# Patient Record
Sex: FEMALE | Race: WHITE | Employment: OTHER | URBAN - METROPOLITAN AREA
[De-identification: names, ages, dates, MRNs, and addresses within clinical notes are randomized per-mention and may not be internally consistent; named-entity substitution may affect disease eponyms.]

---

## 2017-09-21 ENCOUNTER — OFFICE VISIT (OUTPATIENT)
Dept: INTERNAL MEDICINE CLINIC | Age: 82
End: 2017-09-21

## 2017-09-21 ENCOUNTER — HOSPITAL ENCOUNTER (OUTPATIENT)
Dept: LAB | Age: 82
Discharge: HOME | End: 2017-09-21
Payer: MEDICARE

## 2017-09-21 VITALS
OXYGEN SATURATION: 94 % | WEIGHT: 142.6 LBS | HEART RATE: 71 BPM | DIASTOLIC BLOOD PRESSURE: 71 MMHG | HEIGHT: 64 IN | SYSTOLIC BLOOD PRESSURE: 134 MMHG | BODY MASS INDEX: 24.34 KG/M2 | TEMPERATURE: 97.7 F | RESPIRATION RATE: 16 BRPM

## 2017-09-21 DIAGNOSIS — F32.9 REACTIVE DEPRESSION: ICD-10-CM

## 2017-09-21 DIAGNOSIS — F03.B18 MODERATE DEMENTIA WITH BEHAVIORAL DISTURBANCE: Primary | ICD-10-CM

## 2017-09-21 DIAGNOSIS — C44.91 BASAL CELL CARCINOMA: ICD-10-CM

## 2017-09-21 DIAGNOSIS — Z13.5 GLAUCOMA SCREENING: ICD-10-CM

## 2017-09-21 DIAGNOSIS — E55.9 VITAMIN D DEFICIENCY: ICD-10-CM

## 2017-09-21 DIAGNOSIS — E06.3 AUTOIMMUNE HYPOTHYROIDISM: ICD-10-CM

## 2017-09-21 DIAGNOSIS — E53.8 B12 DEFICIENCY: ICD-10-CM

## 2017-09-21 PROCEDURE — 84443 ASSAY THYROID STIM HORMONE: CPT

## 2017-09-21 PROCEDURE — 82306 VITAMIN D 25 HYDROXY: CPT

## 2017-09-21 PROCEDURE — 82607 VITAMIN B-12: CPT

## 2017-09-21 PROCEDURE — 36415 COLL VENOUS BLD VENIPUNCTURE: CPT

## 2017-09-21 PROCEDURE — 81003 URINALYSIS AUTO W/O SCOPE: CPT

## 2017-09-21 PROCEDURE — 85025 COMPLETE CBC W/AUTO DIFF WBC: CPT

## 2017-09-21 PROCEDURE — 80053 COMPREHEN METABOLIC PANEL: CPT

## 2017-09-21 RX ORDER — LOSARTAN POTASSIUM 50 MG/1
TABLET ORAL DAILY
COMMUNITY
End: 2017-12-01 | Stop reason: SDUPTHER

## 2017-09-21 RX ORDER — POTASSIUM CHLORIDE 1.5 G/1.77G
20 POWDER, FOR SOLUTION ORAL DAILY
COMMUNITY
End: 2017-12-01 | Stop reason: SDUPTHER

## 2017-09-21 RX ORDER — LANOLIN ALCOHOL/MO/W.PET/CERES
500 CREAM (GRAM) TOPICAL DAILY
COMMUNITY
End: 2017-12-01 | Stop reason: SDUPTHER

## 2017-09-21 RX ORDER — LEVOTHYROXINE SODIUM 88 UG/1
TABLET ORAL
COMMUNITY
End: 2017-12-01 | Stop reason: SDUPTHER

## 2017-09-21 RX ORDER — DONEPEZIL HYDROCHLORIDE 5 MG/1
TABLET, FILM COATED ORAL DAILY
COMMUNITY
End: 2017-12-01 | Stop reason: SDUPTHER

## 2017-09-21 RX ORDER — LEVOTHYROXINE SODIUM 100 UG/1
TABLET ORAL
COMMUNITY
End: 2017-12-01 | Stop reason: SDUPTHER

## 2017-09-21 RX ORDER — LORAZEPAM 0.5 MG/1
TABLET ORAL
COMMUNITY
End: 2017-12-01 | Stop reason: SDUPTHER

## 2017-09-21 RX ORDER — CHOLECALCIFEROL (VITAMIN D3) 125 MCG
CAPSULE ORAL 2 TIMES DAILY
COMMUNITY
End: 2017-12-01 | Stop reason: SDUPTHER

## 2017-09-21 RX ORDER — MEMANTINE HYDROCHLORIDE 21 MG/1
CAPSULE, EXTENDED RELEASE ORAL DAILY
COMMUNITY
End: 2017-12-01 | Stop reason: SDUPTHER

## 2017-09-21 RX ORDER — CITALOPRAM 10 MG/1
TABLET ORAL EVERY EVENING
COMMUNITY
End: 2017-09-21 | Stop reason: SDUPTHER

## 2017-09-21 RX ORDER — CITALOPRAM 10 MG/1
10 TABLET ORAL EVERY EVENING
Qty: 30 TAB | Refills: 3 | Status: SHIPPED | OUTPATIENT
Start: 2017-09-21 | End: 2017-10-02 | Stop reason: DRUGHIGH

## 2017-09-21 NOTE — MR AVS SNAPSHOT
Visit Information Date & Time Provider Department Dept. Phone Encounter #  
 9/21/2017  3:00 PM Thefred Acuña, 12[address] [facility] [phone] [id number] Upcoming Health Maintenance Date Due DTaP/Tdap/Td series (1 - Tdap) 1/15/1951 ZOSTER VACCINE AGE 60> 11/15/1989 GLAUCOMA SCREENING Q2Y 1/15/1995 OSTEOPOROSIS SCREENING (DEXA) 1/15/1995 Pneumococcal 65+ Low/Medium Risk (1 of 2 - PCV13) 1/15/1995 MEDICARE YEARLY EXAM 1/15/1995 INFLUENZA AGE 9 TO ADULT 8/1/2017 Allergies as of 9/21/2017  Review Complete On: 9/21/2017 By: Armin Frank No Known Allergies Current Immunizations  Never Reviewed No immunizations on file. Not reviewed this visit You Were Diagnosed With   
  
 Codes Comments Moderate dementia with behavioral disturbance    -  Primary ICD-10-CM: F03.91 
ICD-9-CM: 294.21 Basal cell carcinoma     ICD-10-CM: C44.91 
ICD-9-CM: 173.91 Glaucoma screening     ICD-10-CM: Z13.5 ICD-9-CM: V80.1 Reactive depression     ICD-10-CM: F32.9 ICD-9-CM: 300.4 Autoimmune hypothyroidism     ICD-10-CM: E03.9 ICD-9-CM: 244.9 B12 deficiency     ICD-10-CM: E53.8 ICD-9-CM: 266.2 Vitamin D deficiency     ICD-10-CM: E55.9 ICD-9-CM: 268.9 Vitals BP Pulse Temp Resp Height(growth percentile) Weight(growth percentile) 134/71 (BP 1 Location: Right arm, BP Patient Position: Sitting) 71 97.7 °F (36.5 °C) (Oral) 16 5' 3.5\" (1.613 m) 142 lb 9.6 oz (64.7 kg) SpO2 BMI Smoking Status 94% 24.86 kg/m2 Never Smoker BMI and BSA Data Body Mass Index Body Surface Area  
 24.86 kg/m 2 1.7 m 2 Preferred Pharmacy Pharmacy Name Phone [address]  [place] [address] [phone] Your Updated Medication List  
  
   
This list is accurate as of: 9/21/17  4:05 PM.  Always use your most recent med list.  
  
  
  
  
 citalopram 10 mg tablet Commonly known as:  Sable Deaner Take 1 Tab by mouth every evening. At bedtime. Changing to 20 mg every day  
  
 donepezil 5 mg tablet Commonly known as:  ARICEPT Take  by mouth daily. * levothyroxine 88 mcg tablet Commonly known as:  SYNTHROID Take  by mouth. Take 1 tab by mouth daily except for Sunday and wednesday * levothyroxine 100 mcg tablet Commonly known as:  SYNTHROID Take  by mouth. Take 1 tab every Sunday and Wednesday only. LORazepam 0.5 mg tablet Commonly known as:  ATIVAN Take  by mouth. Take 1/2 to 1 tab by mouth every 8 hrs as needed for anxiety/agitation. losartan 50 mg tablet Commonly known as:  COZAAR Take  by mouth daily. NAMENDA XR 21 mg capsule Generic drug:  memantine ER Take  by mouth daily. potassium chloride 20 mEq packet Commonly known as:  KLOR-CON Take 20 mEq by mouth daily. VITAMIN B-12 500 mcg tablet Generic drug:  cyanocobalamin Take 500 mcg by mouth daily. VITAMIN D3 2,000 unit Tab Generic drug:  cholecalciferol (vitamin D3) Take  by mouth two (2) times a day. * Notice: This list has 2 medication(s) that are the same as other medications prescribed for you. Read the directions carefully, and ask your doctor or other care provider to review them with you. Prescriptions Sent to Pharmacy Refills  
 citalopram (CELEXA) 10 mg tablet 3 Sig: Take 1 Tab by mouth every evening. At bedtime. Changing to 20 mg every day Class: Normal  
 Pharmacy: [address] [place] [address] 18, [address] #: [phone] Route: Oral  
  
We Performed the Following CBC WITH AUTOMATED DIFF [08717 CPT(R)] METABOLIC PANEL, COMPREHENSIVE [08767 CPT(R)] REFERRAL TO OPHTHALMOLOGY [REF57 Custom] Comments:  
 Please evaluate patient for routine eye care, glaucoma screening REFERRAL TO PSYCHIATRY [REF91 Custom] Comments:  
 Please evaluate patient for agitated dementia TSH 3RD GENERATION [50070 CPT(R)] URINALYSIS W/ RFLX MICROSCOPIC [05160 CPT(R)] VITAMIN B12 [id number] CPT(R)] VITAMIN D, 25 HYDROXY [id number] CPT(R)] Referral Information Referral ID Referred By Referred To  
  
 [id number] Tesha VALADEZ1 [facility] MD   
   [address] Phone: [phone] Fax: [phone] Visits Status Start Date End Date 1 New Request 9/21/17 9/21/18 If your referral has a status of pending review or denied, additional information will be sent to support the outcome of this decision. Referral ID Referred By Referred To  
 [id number] David VALADEZ MD  
   [address] [facility] [address] [place] [address], [address] [place] [address] Phone: [phone] Fax: [phone] Visits Status Start Date End Date 1 New Request 9/21/17 9/21/18 If your referral has a status of pending review or denied, additional information will be sent to support the outcome of this decision. Introducing [facility] & HEALTH SERVICES! William Campos introduces Log[employer] Apps patient portal. Now you can access parts of your medical record, email your doctor's office, and request medication refills online. 1. In your internet browser, go to [email or web address] 2. Click on the First Time User? Click Here link in the Sign In box. You will see the New Member Sign Up page. 3. Enter your Logical Apps Access Code exactly as it appears below. You will not need to use this code after youve completed the sign-up process. If you do not sign up before the expiration date, you must request a new code. · Log[employer] Apps Access Code: [email or web address] Expires: 12/20/2017  3:05 PM 
 
4. Enter the last four digits of your Social Security Number (xxxx) and Date of Birth (mm/dd/yyyy) as indicated and click Submit. You will be taken to the next sign-up page. 5. Create a Ezakus ID. This will be your Ezakus login ID and cannot be changed, so think of one that is secure and easy to remember. 6. Create a Ezakus password. You can change your password at any time. 7. Enter your Password Reset Question and Answer. This can be used at a later time if you forget your password. 8. Enter your e-mail address. You will receive e-mail notification when new information is available in [address] 9. Click Sign Up. You can now view and download portions of your medical record. 10. Click the Download Summary menu link to download a portable copy of your medical information. If you have questions, please visit the Frequently Asked Questions section of the Ezakus website. Remember, Ezakus is NOT to be used for urgent needs. For medical emergencies, dial 911. Now available from your iPhone and Android! Please provide this summary of care documentation to your next provider. Your primary care clinician is listed as RAMÓN VALADEZ. If you have any questions after today's visit, please call [phone].

## 2017-09-22 ENCOUNTER — TELEPHONE (OUTPATIENT)
Dept: INTERNAL MEDICINE CLINIC | Age: 82
End: 2017-09-22

## 2017-09-22 LAB
25(OH)D3+25(OH)D2 SERPL-MCNC: 48.2 NG/ML (ref 30–100)
ALBUMIN SERPL-MCNC: 4.2 G/DL (ref 3.5–4.7)
ALBUMIN/GLOB SERPL: 1.6 {RATIO} (ref 1.2–2.2)
ALP SERPL-CCNC: 70 IU/L (ref 39–117)
ALT SERPL-CCNC: 8 IU/L (ref 0–32)
APPEARANCE UR: ABNORMAL
AST SERPL-CCNC: 16 IU/L (ref 0–40)
BACTERIA #/AREA URNS HPF: ABNORMAL /[HPF]
BASOPHILS # BLD AUTO: 0 X10E3/UL (ref 0–0.2)
BASOPHILS NFR BLD AUTO: 0 %
BILIRUB SERPL-MCNC: 0.6 MG/DL (ref 0–1.2)
BILIRUB UR QL STRIP: NEGATIVE
BUN SERPL-MCNC: 15 MG/DL (ref 8–27)
BUN/CREAT SERPL: 18 (ref 12–28)
CALCIUM SERPL-MCNC: 9.6 MG/DL (ref 8.7–10.3)
CASTS URNS QL MICRO: ABNORMAL /LPF
CHLORIDE SERPL-SCNC: 102 MMOL/L (ref 96–106)
CO2 SERPL-SCNC: 25 MMOL/L (ref 18–29)
COLOR UR: YELLOW
CREAT SERPL-MCNC: 0.82 MG/DL (ref 0.57–1)
EOSINOPHIL # BLD AUTO: 0.3 X10E3/UL (ref 0–0.4)
EOSINOPHIL NFR BLD AUTO: 3 %
EPI CELLS #/AREA URNS HPF: ABNORMAL /HPF
ERYTHROCYTE [DISTWIDTH] IN BLOOD BY AUTOMATED COUNT: 13.3 % (ref 12.3–15.4)
GLOBULIN SER CALC-MCNC: 2.6 G/DL (ref 1.5–4.5)
GLUCOSE SERPL-MCNC: 81 MG/DL (ref 65–99)
GLUCOSE UR QL: NEGATIVE
HCT VFR BLD AUTO: 42.6 % (ref 34–46.6)
HGB BLD-MCNC: 14.3 G/DL (ref 11.1–15.9)
HGB UR QL STRIP: ABNORMAL
IMM GRANULOCYTES # BLD: 0 X10E3/UL (ref 0–0.1)
IMM GRANULOCYTES NFR BLD: 0 %
KETONES UR QL STRIP: NEGATIVE
LEUKOCYTE ESTERASE UR QL STRIP: NEGATIVE
LYMPHOCYTES # BLD AUTO: 3.2 X10E3/UL (ref 0.7–3.1)
LYMPHOCYTES NFR BLD AUTO: 43 %
MCH RBC QN AUTO: 31.6 PG (ref 26.6–33)
MCHC RBC AUTO-ENTMCNC: 33.6 G/DL (ref 31.5–35.7)
MCV RBC AUTO: 94 FL (ref 79–97)
MICRO URNS: ABNORMAL
MONOCYTES # BLD AUTO: 0.7 X10E3/UL (ref 0.1–0.9)
MONOCYTES NFR BLD AUTO: 9 %
MUCOUS THREADS URNS QL MICRO: PRESENT
NEUTROPHILS # BLD AUTO: 3.3 X10E3/UL (ref 1.4–7)
NEUTROPHILS NFR BLD AUTO: 45 %
NITRITE UR QL STRIP: NEGATIVE
PH UR STRIP: 6 [PH] (ref 5–7.5)
PLATELET # BLD AUTO: 280 X10E3/UL (ref 150–379)
POTASSIUM SERPL-SCNC: 4.3 MMOL/L (ref 3.5–5.2)
PROT SERPL-MCNC: 6.8 G/DL (ref 6–8.5)
PROT UR QL STRIP: NEGATIVE
RBC # BLD AUTO: 4.53 X10E6/UL (ref 3.77–5.28)
RBC #/AREA URNS HPF: ABNORMAL /HPF
SODIUM SERPL-SCNC: 139 MMOL/L (ref 134–144)
SP GR UR: 1.02 (ref 1–1.03)
TSH SERPL DL<=0.005 MIU/L-ACNC: 5.82 UIU/ML (ref 0.45–4.5)
UROBILINOGEN UR STRIP-MCNC: 0.2 MG/DL (ref 0.2–1)
VIT B12 SERPL-MCNC: 1079 PG/ML (ref 211–946)
WBC # BLD AUTO: 7.6 X10E3/UL (ref 3.4–10.8)
WBC #/AREA URNS HPF: ABNORMAL /HPF

## 2017-09-22 NOTE — PROGRESS NOTES
HISTORY OF PRESENT ILLNESS  Travis Jordan is a 80 y.o. female. HPI Ms. Danita Moya is seen today for an introductory visit accompanied by her daughter Dillon Sales who comes to my practice. Social history notable for Ms. Daniat Moya relocating to her children's homes due to worsening dementia. She had been living on her own without family close by down in [place], [place]. The current plan is for Ms. Danita Moya to alternate living with various children on a quarterly basis. 1.  Moderate dementia with anxiety. She is on medication treatment. She has seen a \"memory doctor. \"  This was in the past, however, and it sounds like she mainly saw her primary care physician. Currently she is quite upset because of not being able to go back to her own home. Apparently she had been doing okay having moved in with her children until recently when they went back to visit her home in [place]. This seemed to set off a fair amount of agitation regarding her not being able to be in her own home. Support was offered. I think she would benefit from geriatric psychiatry consultation. We will increase Celexa to 20 mg daily for now. I encouraged caution with the use of Ativan which was prescribed by her former primary care physician's partner recently for agitation. 2.  Hypothyroidism. Due for routine labs. 3.  Vitamin D deficiency, B-12 deficiency. These were apparently documented diagnoses. She is due for recheck. Review of systems notable for some aches and pains, otherwise, no acute medical problems noted. This was an extended visit of high complexity.      MedDATA/gwo     Past Medical History:   Diagnosis Date    Basal cell carcinoma     face    Cataract     bilateral    Depression     Muscle ache     Muscle pain     Stiff joint     Thyroid disease      Past Surgical History:   Procedure Laterality Date    HX CATARACT REMOVAL      bilateral    HX CHOLECYSTECTOMY      HX HYSTERECTOMY      partial still has ovarias.  HX OTHER SURGICAL      basal cell removal on face    HX OTHER SURGICAL      stapedectomy-not sure which ear     Current Outpatient Prescriptions   Medication Sig    donepezil (ARICEPT) 5 mg tablet Take  by mouth daily.  potassium chloride (KLOR-CON) 20 mEq packet Take 20 mEq by mouth daily.  levothyroxine (SYNTHROID) 88 mcg tablet Take  by mouth. Take 1 tab by mouth daily except for Sunday and wednesday    levothyroxine (SYNTHROID) 100 mcg tablet Take  by mouth. Take 1 tab every Sunday and Wednesday only.  losartan (COZAAR) 50 mg tablet Take  by mouth daily.  memantine ER (NAMENDA XR) 21 mg capsule Take  by mouth daily.  cholecalciferol, vitamin D3, (VITAMIN D3) 2,000 unit tab Take  by mouth two (2) times a day.  cyanocobalamin (VITAMIN B-12) 500 mcg tablet Take 500 mcg by mouth daily.  LORazepam (ATIVAN) 0.5 mg tablet Take  by mouth. Take 1/2 to 1 tab by mouth every 8 hrs as needed for anxiety/agitation.  citalopram (CELEXA) 10 mg tablet Take 1 Tab by mouth every evening. At bedtime. Changing to 20 mg every day     No current facility-administered medications for this visit. No Known Allergies  Family History   Problem Relation Age of Onset    COPD Mother          Review of Systems   Unable to perform ROS: Dementia (available info provided by daughter's observation)   Constitutional: Negative for fever and weight loss. Gastrointestinal: Negative for blood in stool and melena. Neurological: Negative for focal weakness and loss of consciousness. Psychiatric/Behavioral: Positive for depression and memory loss. The patient is nervous/anxious. All other systems reviewed and are negative. Physical Exam   Constitutional: She appears well-nourished. Mildly agitated   HENT:   Head: Normocephalic and atraumatic. Eyes: Conjunctivae and EOM are normal. Right eye exhibits no discharge. Left eye exhibits no discharge. Neck: Neck supple.  Carotid bruit is not present. Cardiovascular: Normal rate and regular rhythm. Exam reveals no gallop and no friction rub. No murmur heard. Pulmonary/Chest: Effort normal and breath sounds normal. No respiratory distress. Abdominal: Soft. She exhibits no distension. Musculoskeletal: She exhibits no edema. Lymphadenopathy:     She has no cervical adenopathy. Neurological: She is alert. Skin: Skin is warm and dry. Psychiatric:   Obvious dementia with inability to provide history   Nursing note and vitals reviewed. ASSESSMENT and PLAN  Diagnoses and all orders for this visit:    1. Moderate dementia with behavioral disturbance  -     REFERRAL TO PSYCHIATRY    2. Basal cell carcinoma- See dermatologist as discussed/directed     3. Glaucoma screening  -     REFERRAL TO OPHTHALMOLOGY    4. Reactive depression  -     REFERRAL TO PSYCHIATRY    5. Autoimmune hypothyroidism  -     TSH 3RD GENERATION    6. B12 deficiency  -     METABOLIC PANEL, COMPREHENSIVE  -     CBC WITH AUTOMATED DIFF  -     VITAMIN B12    7. Vitamin D deficiency  -     VITAMIN D, 25 HYDROXY  -     URINALYSIS W/ RFLX MICROSCOPIC    Other orders  -     citalopram (CELEXA) 10 mg tablet; Take 1 Tab by mouth every evening. At bedtime.  Changing to 20 mg every day  -     MICROSCOPIC EXAMINATION

## 2017-09-22 NOTE — TELEPHONE ENCOUNTER
Spoke with pt's daughter Cayla Parekh advised her MD is aware of insurance not approving the extra 10 mg - daughter knows also - she was at pharmacy when pharmacist called us. . His advice is to simply change to the 20 mg tab. It will ok to start when her current bubble pack is completed. She states pt's blister packs are filled already with her daily medications - about 9 pills total - will last until end of November. Can not open pack to change pills out. She wanted MD to know pt saw urologist Dr Poonam Jaimes PA today - he has sent urine culture out for possible UTI. Feels pt may not need extra 10 mg if her anxiety and confusion is from UTI. Results should be back Monday.  Will forward to MD

## 2017-09-26 NOTE — TELEPHONE ENCOUNTER
JUDY-Called and spoke to the pt daughter-Rabia Woody and informed Suggest purchasing the 10 mg if not too expensive, shouldn't be, then give with her other meds. Otherwise she may wait until geriatric psych evaluation. She states her mother has not been to agitated the last 2 days she will just see how things go and leave it as is for now and call office back if she feels there needs to be a med adjustment.

## 2017-10-02 ENCOUNTER — PATIENT MESSAGE (OUTPATIENT)
Dept: INTERNAL MEDICINE CLINIC | Age: 82
End: 2017-10-02

## 2017-10-02 ENCOUNTER — TELEPHONE (OUTPATIENT)
Dept: INTERNAL MEDICINE CLINIC | Age: 82
End: 2017-10-02

## 2017-10-02 RX ORDER — CITALOPRAM 20 MG/1
20 TABLET, FILM COATED ORAL DAILY
Qty: 90 TAB | Refills: 3 | Status: SHIPPED | OUTPATIENT
Start: 2017-10-02 | End: 2017-12-01 | Stop reason: SDUPTHER

## 2017-10-02 NOTE — TELEPHONE ENCOUNTER
----- Message from Jen Tapia MD sent at 10/2/2017  4:44 PM EDT -----  Regarding: FW: Non-Urgent Medical Question  Contact: [phone]  Definitely ok  ----- Message -----     From: Isabella Ramos     Sent: 10/2/2017   4:34 PM       To: Jen Tapia MD  Subject: FW: Non-Urgent Medical Question                  Called and spoke to pt daughter reguarding this she would like rx changed to 20mg wanted to  Make sure this was ok? Looks like from 9/22 telephone call you had said this is ok just wanted to make sure.  ----- Message -----     From: Destineytom Evangelista     Sent: 10/2/2017   9:11 AM       To: Sofia Arias [place]  Subject: Non-Urgent Medical Question                      Dr. Babatunde Estrada,  Thanks for accepting ilir Yin as a new patient. On 9/21 you increased mom's citalopram from 10 mg to 20mg. I have pre-filled blister packs of all of mom's meds through 12/2 from her old pharmacy in [place]. The plan was for me to continue to use the meds I already have and you prescribed an additional 10mg and called it in to [facility]. They had trouble filling it because [employer] thinks it's a duplicate order from what already was filled in [place]. [facility] is asking that your office call them or to call in another prescription to [facility] for 20 mg and I will just waste the 90 day supply of 10 mg that I already have. I can be reached at [phone].   Thanks for your help,  St. dominique Zee

## 2017-10-02 NOTE — TELEPHONE ENCOUNTER
Reviewed lab - follow thyroid on current dosage. For microhematuria See urologist as directed .  Regarding assistance with care, consider not moving her every 3 months

## 2017-10-02 NOTE — TELEPHONE ENCOUNTER
Called and spoke to the pts daughter and informed Dr. Quinten Rhodes has approved to change the dose of celexa to 20mg daily, informed her rx has been sent to the pharmacy.

## 2017-10-18 ENCOUNTER — TELEPHONE (OUTPATIENT)
Dept: INTERNAL MEDICINE CLINIC | Age: 82
End: 2017-10-18

## 2017-10-18 ENCOUNTER — OFFICE VISIT (OUTPATIENT)
Dept: INTERNAL MEDICINE CLINIC | Age: 82
End: 2017-10-18

## 2017-10-18 VITALS
TEMPERATURE: 98.5 F | SYSTOLIC BLOOD PRESSURE: 106 MMHG | RESPIRATION RATE: 16 BRPM | BODY MASS INDEX: 24.24 KG/M2 | HEART RATE: 64 BPM | OXYGEN SATURATION: 95 % | DIASTOLIC BLOOD PRESSURE: 55 MMHG | WEIGHT: 142 LBS | HEIGHT: 64 IN

## 2017-10-18 DIAGNOSIS — M54.50 CHRONIC LEFT-SIDED LOW BACK PAIN WITHOUT SCIATICA: ICD-10-CM

## 2017-10-18 DIAGNOSIS — Z23 ENCOUNTER FOR IMMUNIZATION: ICD-10-CM

## 2017-10-18 DIAGNOSIS — E06.3 AUTOIMMUNE HYPOTHYROIDISM: Primary | ICD-10-CM

## 2017-10-18 DIAGNOSIS — G89.29 CHRONIC LEFT-SIDED LOW BACK PAIN WITHOUT SCIATICA: ICD-10-CM

## 2017-10-18 DIAGNOSIS — R31.29 MICROSCOPIC HEMATURIA: ICD-10-CM

## 2017-10-18 DIAGNOSIS — F03.B18 MODERATE DEMENTIA WITH BEHAVIORAL DISTURBANCE: ICD-10-CM

## 2017-10-18 RX ORDER — MELOXICAM 15 MG/1
TABLET ORAL
Qty: 30 TAB | Refills: 3 | Status: SHIPPED | OUTPATIENT
Start: 2017-10-18 | End: 2017-12-01

## 2017-10-18 NOTE — MR AVS SNAPSHOT
Visit Information Date & Time Provider Department Dept. Phone Encounter #  
 10/18/2017  2:20 PM Lona Loza, [address] [facility] [phone] [id number] Follow-up Instructions Return in about 3 months (around 1/18/2018). Upcoming Health Maintenance Date Due DTaP/Tdap/Td series (1 - Tdap) 1/15/1951 ZOSTER VACCINE AGE 60> 11/15/1989 GLAUCOMA SCREENING Q2Y 1/15/1995 OSTEOPOROSIS SCREENING (DEXA) 1/15/1995 Pneumococcal 65+ Low/Medium Risk (1 of 2 - PCV13) 1/15/1995 MEDICARE YEARLY EXAM 1/15/1995 INFLUENZA AGE 9 TO ADULT 8/1/2017 Allergies as of 10/18/2017  Review Complete On: 10/18/2017 By: Lona Loza MD  
 No Known Allergies Current Immunizations  Never Reviewed No immunizations on file. Not reviewed this visit You Were Diagnosed With   
  
 Codes Comments Autoimmune hypothyroidism    -  Primary ICD-10-CM: E03.9 ICD-9-CM: 244.9 Moderate dementia with behavioral disturbance     ICD-10-CM: F03.91 
ICD-9-CM: 294.21 Chronic left-sided low back pain without sciatica     ICD-10-CM: M54.5, G89.29 ICD-9-CM: 724.2, 338.29 Microscopic hematuria     ICD-10-CM: R31.29 ICD-9-CM: 599.72 Vitals BP Pulse Temp Resp Height(growth percentile) Weight(growth percentile) 106/55 (BP 1 Location: Right arm, BP Patient Position: Sitting) 64 98.5 °F (36.9 °C) (Oral) 16 5' 3.5\" (1.613 m) 142 lb (64.4 kg) SpO2 BMI OB Status Smoking Status 95% 24.76 kg/m2 Postmenopausal Never Smoker BMI and BSA Data Body Mass Index Body Surface Area 24.76 kg/m 2 1.7 m 2 Preferred Pharmacy Pharmacy Name Phone [address] [facility] 48 [phone] Your Updated Medication List  
  
   
This list is accurate as of: 10/18/17  3:14 PM.  Always use your most recent med list.  
  
  
  
  
 citalopram 20 mg tablet Commonly known as:  Katie Narrow Take 1 Tab by mouth daily. donepezil 5 mg tablet Commonly known as:  ARICEPT Take  by mouth daily. * levothyroxine 88 mcg tablet Commonly known as:  SYNTHROID Take  by mouth. Take 1 tab by mouth daily except for Sunday and wednesday * levothyroxine 100 mcg tablet Commonly known as:  SYNTHROID Take  by mouth. Take 1 tab every Sunday and Wednesday only. LORazepam 0.5 mg tablet Commonly known as:  ATIVAN Take  by mouth. Take 1/2 to 1 tab by mouth every 8 hrs as needed for anxiety/agitation. losartan 50 mg tablet Commonly known as:  COZAAR Take  by mouth daily. meloxicam 15 mg tablet Commonly known as:  MOBIC On every day for 2 wks, then every day prn pain NAMENDA XR 21 mg capsule Generic drug:  memantine ER Take  by mouth daily. potassium chloride 20 mEq packet Commonly known as:  KLOR-CON Take 20 mEq by mouth daily. VITAMIN B-12 500 mcg tablet Generic drug:  cyanocobalamin Take 500 mcg by mouth daily. VITAMIN D3 2,000 unit Tab Generic drug:  cholecalciferol (vitamin D3) Take  by mouth two (2) times a day. * Notice: This list has 2 medication(s) that are the same as other medications prescribed for you. Read the directions carefully, and ask your doctor or other care provider to review them with you. Prescriptions Sent to Pharmacy Refills  
 meloxicam (MOBIC) 15 mg tablet 3 Sig: On every day for 2 wks, then every day prn pain  
 Class: Normal  
 Pharmacy: [address] [place] [address] 18, [address] [facility] #: [phone] Follow-up Instructions Return in about 3 months (around 1/18/2018). To-Do List   
 10/18/2017 Imaging:  XR HIP LT W OR WO PELV 2-3 VWS   
  
 10/18/2017 Imaging:  XR SPINE LUMB 2 OR 3 V Introducing [facility] HEALTH SERVICES! Dear Loida Zelaya: Thank you for requesting a [employer] account.   Our records indicate that you already have an active [employer] account. You can access your account anytime at [email or web address] Did you know that you can access your hospital and ER discharge instructions at any time in [employer]? You can also review all of your test results from your hospital stay or ER visit. Additional Information If you have questions, please visit the Frequently Asked Questions section of the [employer] website at [email or web address] Remember, [employer] is NOT to be used for urgent needs. For medical emergencies, dial 911. Now available from your iPhone and Android! Please provide this summary of care documentation to your next provider. Your primary care clinician is listed as RAMÓN VALADEZ. If you have any questions after today's visit, please call [phone].

## 2017-10-18 NOTE — TELEPHONE ENCOUNTER
Called Dr Merrill's (pt's prior pcp) office on [place] - spoke with Todd Rico - advised her pt is a new pt with us. We received records from them  but unclear on her vaccines. Wanted to see what she had. She states they have a new system and records are not update yet. She does not have any in pt's records in new system - states if not given in past 2 year would be in her old records. Requested if they could check on her vaccines given since was a pt with them would be helpful. She will send message to medical records and have them send pt's complete immunization records - to ATTN: Kei Wolfe.

## 2017-10-18 NOTE — PROGRESS NOTES
HISTORY OF PRESENT ILLNESS  Jayden Barone is a 80 y.o. female. [facility] is seen today for follow up of hypothyroidism and other medical problems. 1. Hematuria now with left flank pain. She had a negative CT scan from her urologist on Tuesday 10/17/17. The pain is actually lower than her flank and really in the SI region. The pain can increase with walking and activity, over-the-counter medications have not been helpful. Symptoms are essentially chronic. 2. Hypothyroidism. Check labs in three months. 3. Memory loss. She is taking medications as directed. 4. Preventive medicine. I am trying to research her vaccinations. Her former PCP records are not very clear. There is a listing of a pneumonia vaccine in 2014 but does not specify PD 23 or 13. We will give her flu vaccine today. It does appear she had the Tdap booster. MedDATA/gwo     Past Medical History:   Diagnosis Date    Basal cell carcinoma     face    Cataract     bilateral    Depression     Muscle ache     Muscle pain     Stiff joint     Thyroid disease          Review of Systems   Genitourinary: Positive for flank pain. Negative for hematuria. Musculoskeletal: Positive for back pain and joint pain. Psychiatric/Behavioral: Positive for memory loss. Physical Exam   Constitutional: No distress. Cardiovascular: Normal rate and regular rhythm. Exam reveals no gallop and no friction rub. No murmur heard. Pulmonary/Chest: Effort normal and breath sounds normal.   Musculoskeletal:        Lumbar back: She exhibits normal range of motion, no tenderness, no bony tenderness, no swelling, no deformity and no spasm. Back:    Nursing note and vitals reviewed. ASSESSMENT and PLAN  Diagnoses and all orders for this visit:    1. Autoimmune hypothyroidism- Continue current regimen of prescription and / or OTC medications     2.  Moderate dementia with behavioral disturbance- Continue current regimen of prescription and / or OTC medications     3. Chronic left-sided low back pain without sciatica  -     XR HIP LT W OR WO PELV 2-3 VWS; Future  -     XR SPINE LUMB 2 OR 3 V; Future  -     meloxicam (MOBIC) 15 mg tablet; On every day for 2 wks, then every day prn pain- Reviewed side effects, goal of treatment and need for follow up      4. Microscopic hematuria- See urologist as directed     5.  Encounter for immunization  -     INFLUENZA VIRUS VACCINE, HIGH DOSE SEASONAL, PRESERVATIVE FREE  -     ADMIN INFLUENZA VIRUS VAC    Plan was reviewed with patient and family, understanding expressed

## 2017-11-27 ENCOUNTER — TELEPHONE (OUTPATIENT)
Dept: INTERNAL MEDICINE CLINIC | Age: 82
End: 2017-11-27

## 2017-11-27 NOTE — TELEPHONE ENCOUNTER
Estelle Mohr [facility]) [phone]      pt's daughter is requesting a signed copy of her mother's med list to be [facility]ed [facility], fax number [phone] (f) and phone [phone] (p). She says that her mother will be staying with her for 3 mos and then with her sister. She will need 90 days for each med.

## 2017-12-01 DIAGNOSIS — G89.29 CHRONIC LEFT-SIDED LOW BACK PAIN WITHOUT SCIATICA: ICD-10-CM

## 2017-12-01 DIAGNOSIS — M54.50 CHRONIC LEFT-SIDED LOW BACK PAIN WITHOUT SCIATICA: ICD-10-CM

## 2017-12-01 RX ORDER — LORAZEPAM 0.5 MG/1
TABLET ORAL
Qty: 90 TAB | Refills: 0 | OUTPATIENT
Start: 2017-12-01 | End: 2019-04-10 | Stop reason: DRUGHIGH

## 2017-12-01 RX ORDER — LEVOTHYROXINE SODIUM 100 UG/1
TABLET ORAL
Qty: 30 TAB | Refills: 3 | Status: SHIPPED | OUTPATIENT
Start: 2017-12-01 | End: 2018-05-25

## 2017-12-01 RX ORDER — POTASSIUM CHLORIDE 1.5 G/1.77G
20 POWDER, FOR SOLUTION ORAL DAILY
Qty: 90 PACKET | Refills: 3 | Status: SHIPPED | OUTPATIENT
Start: 2017-12-01 | End: 2018-03-05 | Stop reason: SDUPTHER

## 2017-12-01 RX ORDER — CITALOPRAM 20 MG/1
20 TABLET, FILM COATED ORAL DAILY
Qty: 90 TAB | Refills: 3 | Status: SHIPPED | OUTPATIENT
Start: 2017-12-01 | End: 2018-10-08 | Stop reason: SDUPTHER

## 2017-12-01 RX ORDER — LOSARTAN POTASSIUM 50 MG/1
50 TABLET ORAL DAILY
Qty: 90 TAB | Refills: 3 | Status: SHIPPED | OUTPATIENT
Start: 2017-12-01 | End: 2018-09-28 | Stop reason: SDUPTHER

## 2017-12-01 RX ORDER — MEMANTINE HYDROCHLORIDE 21 MG/1
21 CAPSULE, EXTENDED RELEASE ORAL DAILY
Qty: 90 CAP | Refills: 3 | Status: SHIPPED | OUTPATIENT
Start: 2017-12-01 | End: 2018-11-30 | Stop reason: SDUPTHER

## 2017-12-01 RX ORDER — LANOLIN ALCOHOL/MO/W.PET/CERES
500 CREAM (GRAM) TOPICAL DAILY
Qty: 90 TAB | Refills: 3 | Status: SHIPPED | OUTPATIENT
Start: 2017-12-01 | End: 2018-09-28 | Stop reason: SDUPTHER

## 2017-12-01 RX ORDER — CHOLECALCIFEROL (VITAMIN D3) 125 MCG
2000 CAPSULE ORAL DAILY
Qty: 90 TAB | Refills: 3 | Status: SHIPPED | OUTPATIENT
Start: 2017-12-01 | End: 2018-11-30 | Stop reason: SDUPTHER

## 2017-12-01 RX ORDER — LEVOTHYROXINE SODIUM 88 UG/1
TABLET ORAL
Qty: 90 TAB | Refills: 3 | Status: SHIPPED | OUTPATIENT
Start: 2017-12-01 | End: 2018-05-25

## 2017-12-01 RX ORDER — DONEPEZIL HYDROCHLORIDE 5 MG/1
5 TABLET, FILM COATED ORAL DAILY
Qty: 90 TAB | Refills: 3 | Status: SHIPPED | OUTPATIENT
Start: 2017-12-01 | End: 2018-06-12 | Stop reason: ALTCHOICE

## 2017-12-01 NOTE — TELEPHONE ENCOUNTER
Advised pt's daughter Dorita Chaudhry meds have been sent to pharmacy. Med list updated - Mobic removed - pt does not take.

## 2017-12-01 NOTE — TELEPHONE ENCOUNTER
Spoke with pt's daughter Ana Yang (on HIPPA) - requesting all of pt's meds to be refilled for #90. Pt will be alternating every 3 months staying with her and then with another daughter in [place]. Wanted to have all on same refill schedule if possible. I confirmed pt's med list with daughter as of pt's last visit on 10/18/17. All are same with exception of pt is not taking Mobic 15 mg, vit d is daily not bid and she wants to know if potassium could be changed to powder due to pills are so big and pt chokes on them? Will update med list post MD reviewing. Will forward to MD for approval for #90 and number of refills.

## 2017-12-01 NOTE — TELEPHONE ENCOUNTER
[address] [place] [address] - spoke with pharmacist Morales Roberts - he states Potassium can be ordered in powder form - comes in packets 1.5 grams = 20 meq. Will forward to MD to review and approve. Also pt is taking Ativan 0.5 mg - ok to refill #90 with 3 refills?

## 2018-03-05 RX ORDER — POTASSIUM CHLORIDE 1.5 G/1.77G
20 POWDER, FOR SOLUTION ORAL DAILY
Qty: 90 PACKET | Refills: 1 | Status: SHIPPED | OUTPATIENT
Start: 2018-03-05 | End: 2018-03-21

## 2018-03-20 ENCOUNTER — TELEPHONE (OUTPATIENT)
Dept: INTERNAL MEDICINE CLINIC | Age: 83
End: 2018-03-20

## 2018-03-20 NOTE — TELEPHONE ENCOUNTER
Left detailed message that pt's insurance sent MD a fax - they do not cover Klor-Con packets. MD wanted to know if pt could try liquid form?

## 2018-03-20 NOTE — TELEPHONE ENCOUNTER
Pt's daughter Claudia Mayers returned call - she states she was aware insurance would not cover Klor-Con packets. She thought pt could try liquid form. Will forward to MD for order and directions.

## 2018-03-21 RX ORDER — POTASSIUM CHLORIDE 20MEQ/15ML
20 LIQUID (ML) ORAL DAILY
Qty: 480 ML | Refills: 11 | Status: SHIPPED | OUTPATIENT
Start: 2018-03-21 | End: 2019-02-14 | Stop reason: ALTCHOICE

## 2018-03-21 NOTE — TELEPHONE ENCOUNTER
[address] - spoke with [facility] pharmacist - requested directions for equivalent liquid dosage for potassium 20 mcg. She states 20 mcg = 15 ml. Rx sent to pharmacy.  Will forward to MD.

## 2018-05-01 ENCOUNTER — OFFICE VISIT (OUTPATIENT)
Dept: BEHAVIORAL/MENTAL HEALTH CLINIC | Age: 83
End: 2018-05-01

## 2018-05-01 VITALS
BODY MASS INDEX: 24.98 KG/M2 | SYSTOLIC BLOOD PRESSURE: 118 MMHG | DIASTOLIC BLOOD PRESSURE: 95 MMHG | HEART RATE: 75 BPM | WEIGHT: 141 LBS | HEIGHT: 63 IN

## 2018-05-01 DIAGNOSIS — F32.9 REACTIVE DEPRESSION: ICD-10-CM

## 2018-05-01 DIAGNOSIS — G30.1 LATE ONSET ALZHEIMER'S DISEASE WITH BEHAVIORAL DISTURBANCE (HCC): Primary | ICD-10-CM

## 2018-05-01 DIAGNOSIS — F02.818 LATE ONSET ALZHEIMER'S DISEASE WITH BEHAVIORAL DISTURBANCE (HCC): Primary | ICD-10-CM

## 2018-05-01 PROBLEM — R31.29 MICROSCOPIC HEMATURIA: Status: RESOLVED | Noted: 2017-10-18 | Resolved: 2018-05-01

## 2018-05-01 PROBLEM — F03.B18 MODERATE DEMENTIA WITH BEHAVIORAL DISTURBANCE: Status: RESOLVED | Noted: 2017-09-21 | Resolved: 2018-05-01

## 2018-05-01 PROBLEM — E07.9 THYROID DISEASE: Status: RESOLVED | Noted: 2018-05-01 | Resolved: 2018-05-01

## 2018-05-01 PROBLEM — E07.9 THYROID DISEASE: Status: ACTIVE | Noted: 2018-05-01

## 2018-05-01 NOTE — MR AVS SNAPSHOT
[address] [facility] 
[phone] Patient: Ju Piña MRN: [id number] MLM:[id number] Visit Information Date & Time Provider Department Dept. Phone Encounter #  
 5/1/2018  3:00 PM Anabel Redd, [facility] [phone] [id number] Upcoming Health Maintenance Date Due  
 GLAUCOMA SCREENING Q2Y 1/15/1995 Bone Densitometry (Dexa) Screening 1/15/1995 Pneumococcal 65+ Low/Medium Risk (2 of 2 - PCV13) 9/30/2011 MEDICARE YEARLY EXAM 3/20/2018 Influenza Age 5 to Adult 8/1/2018 DTaP/Tdap/Td series (3 - Td) 3/20/2023 Allergies as of 5/1/2018  Review Complete On: 5/1/2018 By: Rosetta Darden No Known Allergies Current Immunizations  Reviewed on 10/20/2017 Name Date Influenza High Dose Vaccine PF 10/18/2017, 11/27/2013, 10/1/2011, 9/30/2010 Pneumococcal Polysaccharide (PPSV-23) 9/30/2010 Tdap 3/20/2013, 12/1/2000 Zoster Vaccine, Live 3/1/2010 Not reviewed this visit Vitals BP Pulse Height(growth percentile) Weight(growth percentile) BMI OB Status (!) 118/95 75 5' 3\" (1.6 m) 141 lb (64 kg) 24.98 kg/m2 Postmenopausal  
 Smoking Status Never Smoker Vitals History BMI and BSA Data Body Mass Index Body Surface Area 24.98 kg/m 2 1.69 m 2 Preferred Pharmacy Pharmacy Name Phone [address] [place] [address] [phone] Your Updated Medication List  
  
   
This list is accurate as of 5/1/18  3:31 PM.  Always use your most recent med list.  
  
  
  
  
 cholecalciferol (vitamin D3) 2,000 unit Tab Commonly known as:  VITAMIN D3 Take 1 Tab by mouth daily. citalopram 20 mg tablet Commonly known as:  Margreta Germania Take 1 Tab by mouth daily. cyanocobalamin 500 mcg tablet Commonly known as:  VITAMIN B-12 Take 1 Tab by mouth daily. donepezil 5 mg tablet Commonly known as:  ARICEPT Take 1 Tab by mouth daily. * levothyroxine 100 mcg tablet Commonly known as:  SYNTHROID Take 1 tab every Sunday and Wednesday only. * levothyroxine 88 mcg tablet Commonly known as:  SYNTHROID Take 1 tab by mouth daily except for Sunday and wednesday LORazepam 0.5 mg tablet Commonly known as:  ATIVAN Take 1/2 to 1 tab by mouth every 8 hrs as needed for anxiety/agitation. losartan 50 mg tablet Commonly known as:  COZAAR Take 1 Tab by mouth daily. memantine ER 21 mg capsule Commonly known as:  NAMENDA XR Take 1 Cap by mouth daily. potassium chloride 20 mEq/15 mL solution Commonly known as:  KAON 10% Take 15 mL by mouth daily. * Notice: This list has 2 medication(s) that are the same as other medications prescribed for you. Read the directions carefully, and ask your doctor or other care provider to review them with you. Introducing [facility] & HEALTH SERVICES! Dear Lisette Merino: Thank you for requesting a OtherInbox account. Our records indicate that you already have an active OtherInbox account. You can access your account anytime at [email or web address] Did you know that you can access your hospital and ER discharge instructions at any time in OtherInbox? You can also review all of your test results from your hospital stay or ER visit. Additional Information If you have questions, please visit the Frequently Asked Questions section of the OtherInbox website at [email or web address] Remember, OtherInbox is NOT to be used for urgent needs. For medical emergencies, dial 911. Now available from your iPhone and Android! Please provide this summary of care documentation to your next provider. Your primary care clinician is listed as RAMÓN VALADEZ. If you have any questions after today's visit, please call [phone].

## 2018-05-01 NOTE — PROGRESS NOTES
Ambulatory Initial Psychiatric Evaluation     Chief Complaint:   Chief Complaint   Patient presents with    New Patient     Referred for dementia        History of Present Illness: Beverli Bamberger is an 80 y.o. , White female who presents with h/o dementia, depression and multiple medical problems, was referred by her primary care physician for psychiatric evaluation and medication management. Patient is currently taking combination of Celexa, Aricept, Namenda and Lorazepam on an as-needed basis. Patient was brought by her daughter, Onur Soni with whom she is currently living. Patient goes back and forth between her 2 daughters, 1 in [place] and another one in [place]. This rotation has been done every 2 months for last many months. Initially it was decided by her children to do this rotation every 4 months after she was moved out of her house about a year and a half ago as she was determined by her physician that she cannot live alone. Four month rotation was too much for the family so they decided to shortened it to every 2 month. Patient was cooperative and pleasantly confused. She has significant language deficit and is not able to complete her sentences in response to the questions asked. She kept on looking at her daughter for the answer. She also had significant difficulty in comprehending the questions. Patient has moderate to severe cognitive deficit. According to the daughter patient continues to perform her ADLs but has a lot of difficulty in her IADLs. She ambulates reasonably well. She is partially continent of bladder and bowel. She sleeps pretty well and has a good appetite. She gets frustrated very easily as she is not able to explain herself with a poor language command at this stage. Her frustration turn into anger at times but has never been physically or verbally abusive. Her anger subsides very quickly.   Her daughter feels that she is more depressed than she can explain. There are no psychotic or manic symptoms. Patient does not have any obsessive thinking or compulsive behaviors. Patient does not have any reports of nightmares or flashbacks. She has never expressed SI or HI. Scales:  GDS: 2/15 - WNL  Cognitive testing- not able to participte      Past Psychiatric History:   As per HPI patient has a history of dementia since 2009 and was later diagnosed to be depressed. Her cognitive decline has been insidious and gradual.  Patient has been on the combination of her current psychotropic medication including Celexa, Aricept, Namenda and Lorazepam since 2009. Patient has never been hospitalized for mental health reasons. She has never received ECT or [address]. Past history of substance use:   Patient does not have any history of illicit drug or alcohol abuse. Patient is not a smoker. Social History:   Patient is a  for 5 years. She has 3 daughters and 1 son. She was living independently until 1-1/2 year ago and it was decided that she can no longer live alone. Patient does not have any legal history. She has no history of childhood abuse. Family History:   Family History   Problem Relation Age of Onset    COPD Mother         Past Medical History:   Past Medical History:   Diagnosis Date    Basal cell carcinoma     face    Cataract     bilateral    Depression     Muscle ache     Muscle pain     Stiff joint     Thyroid disease          Allergies:   No Known Allergies     Medication List:   Current Outpatient Prescriptions   Medication Sig Dispense Refill    potassium chloride (KAON 10%) 20 mEq/15 mL solution Take 15 mL by mouth daily. 480 mL 11    cholecalciferol, vitamin D3, (VITAMIN D3) 2,000 unit tab Take 1 Tab by mouth daily. 90 Tab 3    citalopram (CELEXA) 20 mg tablet Take 1 Tab by mouth daily. 90 Tab 3    cyanocobalamin (VITAMIN B-12) 500 mcg tablet Take 1 Tab by mouth daily.  90 Tab 3    donepezil (ARICEPT) 5 mg tablet Take 1 Tab by mouth daily. 90 Tab 3    levothyroxine (SYNTHROID) 100 mcg tablet Take 1 tab every Sunday and Wednesday only. 30 Tab 3    levothyroxine (SYNTHROID) 88 mcg tablet Take 1 tab by mouth daily except for Sunday and wednesday 90 Tab 3    LORazepam (ATIVAN) 0.5 mg tablet Take 1/2 to 1 tab by mouth every 8 hrs as needed for anxiety/agitation. 90 Tab 0    losartan (COZAAR) 50 mg tablet Take 1 Tab by mouth daily. 90 Tab 3    memantine ER (NAMENDA XR) 21 mg capsule Take 1 Cap by mouth daily. 80 Cap 3        ROS:  Constitutional: positive for fatigue  Eyes: positive for contacts/glasses  Ears, nose, mouth, throat, and face: positive for hearing loss  Respiratory: negative for cough or wheezing  Cardiovascular: negative for chest pain, palpitations  Gastrointestinal: negative for reflux symptoms and constipation  Genitourinary:negative for frequency and urinary incontinence  Integument/breast: negative for skin lesion(s) and breast lump  Musculoskeletal:positive for arthralgias  Neurological: positive for memory problems and gait problems  Behavioral/Psych: positive for dementia, anxiety and depression, negative for SI or HI  Endocrine: negative for diabetic symptoms including polyuria and polydipsia    Psychiatric/Mental Status Examination:     MENTAL STATUS EXAM:  Sensorium  confused, Alert and Oriented x 1   Orientation person   Relations cooperative and passive   Eye Contact appropriate   Appearance:  age appropriate and casually dressed   Motor Behavior:  within normal limits   Speech:  normal pitch and normal volume   Vocabulary average   Thought Process: disorganized   Thought Content Poverty of content   Suicidal ideations none   Homicidal ideations none   Mood:  depressed   Affect:  anxious   Memory recent  impaired   Memory remote:  impaired   Concentration:  impaired   Abstraction:  concrete   Insight:  poor   Reliability poor   Judgment:  poor       Assessement & Diagnoses:  This is an 12-year-old,  white female, with history of dementia for more than 9 years, depression/anxiety for about 9 years and multiple medical problems was seen today to establish her mental health treatment here. Patient's living situation is inconsistent and she lives with 1 daughter in [place] for 2 months and then moves to another daughter in [place] for 2 months. Patient's cognition is gradually declining but continues to have intact ADL, behaviors and intact biological rhythm with good sleep and appetite. Patient does not have any significant behavioral issues except for frustration and anger due to poor languages skills. Patient is tolerating her current psychotropic medications well. It appears like patient has significant difficulty adjusting to her inconsistent living situations. Primary diagnosis: Dementia, Alzheimer's type, late onset, moderate to severe, depressive disorder NOS, anxiety disorder NOS    Secondary diagnosis: No significant personality disorder noted    Tertiary diagnosis: Hypothyroidism, bilateral cataracts, hypertension, vitamin D deficiencies, vitamin B12  Deficiency. Strength & Weaknesses: Cooperative and pleasant, supportive family, medically stable. Treatment Plan:   1. Medication: no changes recommended at this time and her psychotropic medications. Daughter was advised to use as needed lorazepam for anger episodes    2. Discussed: the potential medication side effects  dry mouth, GI disturbance, weight loss, somnolence, tremor  patient given opportunity to ask questions     3. Psychotherapy: No psychotherapy recommended at this time    4. Medical: With PCP    5. Education: The daughter was educated on the inconsistency of patient's living environment. This constant back and forth has negative impact on patient's cognition, behavior and activity of daily living. She was strongly advised to work with her other family members to develop a plan to keep patient in one location.   It is also my opinion that medication does not have a major role to play in her quality of life at this stage of her illness. Consistency of living situation will play a major role in how her dementia progresses. It was also recommended to place patient in Senior Day care, e.g. [facility] for increased social, intellectual and emotional activities. 6. Return to Clinic: PRN only    The risk versus benefits of treatment were discussed and side effects explained. Patient /daughter agreed with plan. Daughter instructed to call with any side effects.      Time spent with Patient/daughter:  30 to 74 minutes    Abbe Armendariz MD  5/1/2018

## 2018-05-07 ENCOUNTER — OFFICE VISIT (OUTPATIENT)
Dept: INTERNAL MEDICINE CLINIC | Age: 83
End: 2018-05-07

## 2018-05-07 VITALS
HEART RATE: 72 BPM | TEMPERATURE: 98 F | BODY MASS INDEX: 25.23 KG/M2 | WEIGHT: 142.4 LBS | SYSTOLIC BLOOD PRESSURE: 130 MMHG | HEIGHT: 63 IN | RESPIRATION RATE: 18 BRPM | DIASTOLIC BLOOD PRESSURE: 63 MMHG | OXYGEN SATURATION: 97 %

## 2018-05-07 DIAGNOSIS — R59.1 LYMPHADENOPATHY OF HEAD AND NECK: ICD-10-CM

## 2018-05-07 DIAGNOSIS — J30.1 SEASONAL ALLERGIC RHINITIS DUE TO POLLEN: Primary | ICD-10-CM

## 2018-05-07 RX ORDER — MINERAL OIL
180 ENEMA (ML) RECTAL DAILY
Qty: 10 TAB | Refills: 0
Start: 2018-05-07 | End: 2018-05-17

## 2018-05-07 NOTE — MR AVS SNAPSHOT
[address] [facility] [address] [place] [address] 
[phone] Patient: Travis Jordan MRN: [id number] XKC:[id number][birth year]59/9336 Visit Information Date & Time Provider Department Dept. Phone Encounter #  
 5/7/2018  5:05 PM Ilir Gilmore [facility] Internal Medicine [phone] Follow-up Instructions Return in about 2 weeks (around 5/21/2018). Upcoming Health Maintenance Date Due  
 GLAUCOMA SCREENING Q2Y 1/15/1995 Bone Densitometry (Dexa) Screening 1/15/1995 Pneumococcal 65+ Low/Medium Risk (2 of 2 - PCV13) 9/30/2011 MEDICARE YEARLY EXAM 3/20/2018 Influenza Age 5 to Adult 8/1/2018 DTaP/Tdap/Td series (3 - Td) 3/20/2023 Allergies as of 5/7/2018  Review Complete On: 5/7/2018 By: Ilir iGlmore MD  
 No Known Allergies Current Immunizations  Reviewed on 10/20/2017 Name Date Influenza High Dose Vaccine PF 10/18/2017, 11/27/2013, 10/1/2011, 9/30/2010 Pneumococcal Polysaccharide (PPSV-23) 9/30/2010 Tdap 3/20/2013, 12/1/2000 Zoster Vaccine, Live 3/1/2010 Not reviewed this visit You Were Diagnosed With   
  
 Codes Comments Seasonal allergic rhinitis due to pollen    -  Primary ICD-10-CM: J30.1 ICD-9-CM: 477.0 Lymphadenopathy of head and neck     ICD-10-CM: R59.1 ICD-9-CM: 898. 6 Vitals BP Pulse Temp Resp Height(growth percentile) Weight(growth percentile) 130/63 (BP 1 Location: Right arm, BP Patient Position: Sitting) 72 98 °F (36.7 °C) (Oral) 18 5' 3\" (1.6 m) 142 lb 6.4 oz (64.6 kg) SpO2 BMI OB Status Smoking Status 97% 25.23 kg/m2 Postmenopausal Never Smoker BMI and BSA Data Body Mass Index Body Surface Area  
 25.23 kg/m 2 1.69 m 2 Preferred Pharmacy Pharmacy Name Phone [address] [place] [address] [phone] Your Updated Medication List  
  
   
 This list is accurate as of 5/7/18  5:34 PM.  Always use your most recent med list.  
  
  
  
  
 cholecalciferol (vitamin D3) 2,000 unit Tab Commonly known as:  VITAMIN D3 Take 1 Tab by mouth daily. citalopram 20 mg tablet Commonly known as:  Sable Deaner Take 1 Tab by mouth daily. cyanocobalamin 500 mcg tablet Commonly known as:  VITAMIN B-12 Take 1 Tab by mouth daily. donepezil 5 mg tablet Commonly known as:  ARICEPT Take 1 Tab by mouth daily. fexofenadine 180 mg tablet Commonly known as:  Hiren Arlet Take 1 Tab by mouth daily for 10 days. * levothyroxine 100 mcg tablet Commonly known as:  SYNTHROID Take 1 tab every Sunday and Wednesday only. * levothyroxine 88 mcg tablet Commonly known as:  SYNTHROID Take 1 tab by mouth daily except for Sunday and wednesday LORazepam 0.5 mg tablet Commonly known as:  ATIVAN Take 1/2 to 1 tab by mouth every 8 hrs as needed for anxiety/agitation. losartan 50 mg tablet Commonly known as:  COZAAR Take 1 Tab by mouth daily. memantine ER 21 mg capsule Commonly known as:  NAMENDA XR Take 1 Cap by mouth daily. potassium chloride 20 mEq/15 mL solution Commonly known as:  KAON 10% Take 15 mL by mouth daily. * Notice: This list has 2 medication(s) that are the same as other medications prescribed for you. Read the directions carefully, and ask your doctor or other care provider to review them with you. Follow-up Instructions Return in about 2 weeks (around 5/21/2018). Introducing [facility] & HEALTH SERVICES! Dear Cinthia Lezama: Thank you for requesting a [employer] account. Our records indicate that you already have an active [employer] account. You can access your account anytime at [email or web address] Did you know that you can access your hospital and ER discharge instructions at any time in [employer]?   You can also review all of your test results from your hospital stay or ER visit. Additional Information If you have questions, please visit the Frequently Asked Questions section of the "[employer] website at [email or web address] Remember, "[employer] is NOT to be used for urgent needs. For medical emergencies, dial 911. Now available from your iPhone and Android! Please provide this summary of care documentation to your next provider. Your primary care clinician is listed as RAMÓN VALADEZ. If you have any questions after today's visit, please call [phone].

## 2018-05-07 NOTE — PROGRESS NOTES
HISTORY OF PRESENT ILLNESS  Monster Loyd is a 80 y.o. female. [place]   The history is provided by the relative (in with daughter Ji- noted swelling of right neck). This is a new problem. Episode onset: 2 wks. The problem occurs constantly. The problem has not changed since onset. Nasal Discharge   The history is provided by the relative. This is a new problem. The current episode started more than 2 days ago. Cough     Itchy Eye    Associated symptoms include discharge, eye redness and itching. Pertinent negatives include no fever. Eye Drainage    The history is provided by the relative. This is a new problem. The current episode started more than 2 days ago. The problem occurs daily. The problem has not changed since onset. The right eye is affected. The injury mechanism was none. The patient is experiencing no pain. There is no history of trauma to the eye. There is no known exposure to pink eye. She does not wear contacts. Associated symptoms include discharge, eye redness and itching. Pertinent negatives include no fever. She has tried nothing for the symptoms. Review of Systems   Unable to perform ROS: Dementia (daughter provides ros by observation)   Constitutional: Negative for fever. HENT: Positive for congestion. Eyes: Positive for discharge, redness and itching. Respiratory: Positive for cough. Skin: Positive for itching. Physical Exam   Constitutional: No distress. HENT:   Right Ear: Tympanic membrane and ear canal normal.   Ears:    Nose: Nasal discharge present. Mouth/Throat: Oropharynx is clear and moist. No oropharyngeal exudate. Neck: Neck supple. No thyromegaly present. Cardiovascular: Normal rate and regular rhythm. Exam reveals no gallop and no friction rub. No murmur heard. Pulmonary/Chest: Effort normal and breath sounds normal.   Lymphadenopathy:        Head (right side): Submandibular adenopathy present.         Head (left side): Submandibular adenopathy present. She has no cervical adenopathy. Nursing note and vitals reviewed. ASSESSMENT and PLAN  Diagnoses and all orders for this visit:    1. Seasonal allergic rhinitis due to pollen  -     fexofenadine (ALLEGRA) 180 mg tablet; Take 1 Tab by mouth daily for 10 days. 2. Lymphadenopathy of head and neck  -     fexofenadine (ALLEGRA) 180 mg tablet; Take 1 Tab by mouth daily for 10 days.     Plan was reviewed with patient and family, understanding expressed

## 2018-05-22 ENCOUNTER — HOSPITAL ENCOUNTER (OUTPATIENT)
Dept: LAB | Age: 83
Discharge: HOME | End: 2018-05-22
Payer: MEDICARE

## 2018-05-22 ENCOUNTER — DOCUMENTATION ONLY (OUTPATIENT)
Dept: INTERNAL MEDICINE CLINIC | Age: 83
End: 2018-05-22

## 2018-05-22 ENCOUNTER — OFFICE VISIT (OUTPATIENT)
Dept: INTERNAL MEDICINE CLINIC | Age: 83
End: 2018-05-22

## 2018-05-22 VITALS
OXYGEN SATURATION: 95 % | HEIGHT: 63 IN | HEART RATE: 81 BPM | TEMPERATURE: 98.3 F | WEIGHT: 141.4 LBS | BODY MASS INDEX: 25.05 KG/M2 | RESPIRATION RATE: 18 BRPM | DIASTOLIC BLOOD PRESSURE: 60 MMHG | SYSTOLIC BLOOD PRESSURE: 112 MMHG

## 2018-05-22 DIAGNOSIS — F02.818 LATE ONSET ALZHEIMER'S DISEASE WITH BEHAVIORAL DISTURBANCE (HCC): ICD-10-CM

## 2018-05-22 DIAGNOSIS — Z78.0 POSTMENOPAUSE: ICD-10-CM

## 2018-05-22 DIAGNOSIS — G30.1 LATE ONSET ALZHEIMER'S DISEASE WITH BEHAVIORAL DISTURBANCE (HCC): ICD-10-CM

## 2018-05-22 DIAGNOSIS — Z66 DNR (DO NOT RESUSCITATE): ICD-10-CM

## 2018-05-22 DIAGNOSIS — E06.3 AUTOIMMUNE HYPOTHYROIDISM: Primary | ICD-10-CM

## 2018-05-22 DIAGNOSIS — Z23 ENCOUNTER FOR IMMUNIZATION: ICD-10-CM

## 2018-05-22 DIAGNOSIS — H10.13 ALLERGIC CONJUNCTIVITIS OF BOTH EYES: ICD-10-CM

## 2018-05-22 DIAGNOSIS — F32.9 REACTIVE DEPRESSION: ICD-10-CM

## 2018-05-22 DIAGNOSIS — R59.9 ENLARGED LYMPH NODE: ICD-10-CM

## 2018-05-22 PROCEDURE — 36415 COLL VENOUS BLD VENIPUNCTURE: CPT

## 2018-05-22 PROCEDURE — 80053 COMPREHEN METABOLIC PANEL: CPT

## 2018-05-22 PROCEDURE — 85025 COMPLETE CBC W/AUTO DIFF WBC: CPT

## 2018-05-22 PROCEDURE — 84443 ASSAY THYROID STIM HORMONE: CPT

## 2018-05-22 PROCEDURE — 81001 URINALYSIS AUTO W/SCOPE: CPT

## 2018-05-22 NOTE — MR AVS SNAPSHOT
[address] [place] [address] 
[phone] Patient: Savanah Dowell MRN: [id number] Visit Information Date & Time Provider Department Dept. Phone Encounter #  
 5/22/2018 11:00 AM Lynette Blood, [address] [place] [address] Internal Medicine [phone] [id number] Follow-up Instructions Return in about 6 months (around 11/22/2018). Upcoming Health Maintenance Date Due  
 GLAUCOMA SCREENING Q2Y 1/15/1995 Bone Densitometry (Dexa) Screening 1/15/1995 Pneumococcal 65+ Low/Medium Risk (2 of 2 - PCV13) 9/30/2011 MEDICARE YEARLY EXAM 3/20/2018 Influenza Age 5 to Adult 8/1/2018 DTaP/Tdap/Td series (3 - Td) 3/20/2023 Allergies as of 5/22/2018  Review Complete On: 5/22/2018 By: Lynette Blood MD  
 No Known Allergies Current Immunizations  Reviewed on 10/20/2017 Name Date Influenza High Dose Vaccine PF 10/18/2017, 11/27/2013, 10/1/2011, 9/30/2010 Pneumococcal Polysaccharide (PPSV-23) 9/30/2010 Tdap 3/20/2013, 12/1/2000 Zoster Vaccine, Live 3/1/2010 Not reviewed this visit You Were Diagnosed With   
  
 Codes Comments Autoimmune hypothyroidism    -  Primary ICD-10-CM: E06.3 ICD-9-CM: 244.8 Postmenopause     ICD-10-CM: Z78.0 ICD-9-CM: V49.81 Encounter for immunization     ICD-10-CM: D55 ICD-9-CM: V03.89 DNR (do not resuscitate)     ICD-10-CM: P13 ICD-9-CM: V49.86 Reactive depression     ICD-10-CM: F32.9 ICD-9-CM: 300.4 Late onset Alzheimer's disease with behavioral disturbance     ICD-10-CM: G30.1, F02.81 ICD-9-CM: 331.0, 294.11 Enlarged lymph node     ICD-10-CM: R59.9 ICD-9-CM: 226. 6 Allergic conjunctivitis of both eyes     ICD-10-CM: H10.13 ICD-9-CM: 372.14 Vitals BP Pulse Temp Resp Height(growth percentile) Weight(growth percentile)  112/60 (BP 1 Location: Right arm, BP Patient Position: Sitting) 81 98.3 °F (36.8 °C) (Oral) 18 5' 3\" (1.6 m) 141 lb 6.4 oz (64.1 kg) SpO2 BMI OB Status Smoking Status 95% 25.05 kg/m2 Postmenopausal Never Smoker BMI and BSA Data Body Mass Index Body Surface Area 25.05 kg/m 2 1.69 m 2 Preferred Pharmacy Pharmacy Name Phone [facility]  [phone] Your Updated Medication List  
  
   
This list is accurate as of 5/22/18 11:40 AM.  Always use your most recent med list.  
  
  
  
  
 cholecalciferol (vitamin D3) 2,000 unit Tab Commonly known as:  VITAMIN D3 Take 1 Tab by mouth daily. citalopram 20 mg tablet Commonly known as:  Erasmo Copa Take 1 Tab by mouth daily. cyanocobalamin 500 mcg tablet Commonly known as:  VITAMIN B-12 Take 1 Tab by mouth daily. donepezil 5 mg tablet Commonly known as:  ARICEPT Take 1 Tab by mouth daily. * levothyroxine 100 mcg tablet Commonly known as:  SYNTHROID Take 1 tab every Sunday and Wednesday only. * levothyroxine 88 mcg tablet Commonly known as:  SYNTHROID Take 1 tab by mouth daily except for Sunday and wednesday LORazepam 0.5 mg tablet Commonly known as:  ATIVAN Take 1/2 to 1 tab by mouth every 8 hrs as needed for anxiety/agitation. losartan 50 mg tablet Commonly known as:  COZAAR Take 1 Tab by mouth daily. memantine ER 21 mg capsule Commonly known as:  NAMENDA XR Take 1 Cap by mouth daily. naphazoline-pheniramine 0.025-0.3 % ophthalmic solution Commonly known as:  Gearlean Night Administer 2 Drops to both eyes four (4) times daily as needed. pneumococcal 13 davidson conj dip 0.5 mL Syrg injection Commonly known as:  PREVNAR-13  
0.5 mL by IntraMUSCular route once for 1 dose. potassium chloride 20 mEq/15 mL solution Commonly known as:  KAON 10% Take 15 mL by mouth daily. * Notice:   This list has 2 medication(s) that are the same as other medications prescribed for you. Read the directions carefully, and ask your doctor or other care provider to review them with you. Prescriptions Printed Refills  
 pneumococcal 13 davidson conj dip (PREVNAR-13) 0.5 mL syrg injection 0 Si.5 mL by IntraMUSCular route once for 1 dose. Class: Print Route: IntraMUSCular We Performed the Following CBC WITH AUTOMATED DIFF [80268 CPT(R)] METABOLIC PANEL, COMPREHENSIVE [25110 CPT(R)] REFERRAL TO ENT-OTOLARYNGOLOGY [GVD73 Custom] TSH 3RD GENERATION [14053 CPT(R)] URINALYSIS W/ RFLX MICROSCOPIC [08638 CPT(R)] Follow-up Instructions Return in about 6 months (around 2018). Referral Information Referral ID Referred By Referred To  
  
 [id number] Randi VALADEZ MD   
   [facility]   
   [address] [facility], [address] [place] [address] Phone: [phone] Fax: [phone] Visits Status Start Date End Date 1 New Request 18 If your referral has a status of pending review or denied, additional information will be sent to support the outcome of this decision. Introducing [facility]! Dear Alea Jarrett: Thank you for requesting a Add[employer] account. Our records indicate that you already have an active Addite[employer] account. You can access your account anytime at [email or web address] Did you know that you can access your hospital and ER discharge instructions at any time in Add[employer]? You can also review all of your test results from your hospital stay or ER visit. Additional Information If you have questions, please visit the Frequently Asked Questions section of the [employer] website at [email or web address] Remember, Add[employer]ch is NOT to be used for urgent needs. For medical emergencies, dial 911. Now available from your iPhone and Android! Please provide this summary of care documentation to your next provider. Your primary care clinician is listed as RAMÓN VALADEZ. If you have any questions after today's visit, please call [phone].

## 2018-05-22 NOTE — PROGRESS NOTES
Faxed to [facility] pt's medical forms completed by MD - [phone]. Confirmation received.

## 2018-05-22 NOTE — PROGRESS NOTES
HISTORY OF PRESENT ILLNESS  Chris Hannah is a 80 y.o. female. HPI [place] Asya is seen today accompanied by her daughter for follow up of a neck swelling and other medical problems. 1. Hypertension. Stable on current regimen. 2.  Hypothyroidism. Due for routine labs. She will have these done today. 3.  Neck swelling. She had small nodules consistent with reactive lymphadenopathy. They have not resolved, however, and may be a touch larger. Will refer for  ENT evaluation. 4.  Allergic conjunctivitis. Continue current regimen which has been helpful. She will also utilize some over-the-counter eye drops if needed. Social History: Notable for completion of local DNR forms. She had forms from [place]. She also requires forms for completion in order for her to attend the [place] [address]. Reviewed with her daughter I thought this was a very good idea. Review of Systems   Unable to perform ROS: Dementia   Eyes: Positive for discharge. Physical Exam   Constitutional: No distress. Eyes: Right eye exhibits discharge. Right eye exhibits no exudate. Left eye exhibits discharge. Left eye exhibits no exudate. Right conjunctiva is not injected. Left conjunctiva is not injected. Neck:       Cardiovascular: Normal rate and regular rhythm. Exam reveals no gallop and no friction rub. No murmur heard. Pulmonary/Chest: Effort normal and breath sounds normal.   Nursing note and vitals reviewed. ASSESSMENT and PLAN  Diagnoses and all orders for this visit:    1. Autoimmune hypothyroidism  -     TSH 3RD GENERATION    2. Postmenopause- defer DEXA    3. Encounter for immunization  -     pneumococcal 13 davidson conj dip (PREVNAR-13) 0.5 mL syrg injection; 0.5 mL by IntraMUSCular route once for 1 dose. 4. DNR (do not resuscitate)    5. Reactive depression  -     URINALYSIS W/ RFLX MICROSCOPIC    6.  Late onset Alzheimer's disease with behavioral disturbance- Continue current regimen of prescription and / or OTC medications , See psychiatrist as directed     7. Enlarged lymph node  -     METABOLIC PANEL, COMPREHENSIVE  -     CBC WITH AUTOMATED DIFF  -     REFERRAL TO ENT-OTOLARYNGOLOGY    8. Allergic conjunctivitis of both eyes  -     naphazoline-pheniramine (NAPHCON-A) 0.025-0.3 % ophthalmic solution; Administer 2 Drops to both eyes four (4) times daily as needed.     Other orders  -     MICROSCOPIC EXAMINATION    Plan was reviewed with  family, understanding expressed

## 2018-05-23 ENCOUNTER — HOSPITAL ENCOUNTER (OUTPATIENT)
Dept: LAB | Age: 83
Discharge: HOME | End: 2018-05-23
Payer: MEDICARE

## 2018-05-23 DIAGNOSIS — N39.0 URINARY TRACT INFECTION WITHOUT HEMATURIA, SITE UNSPECIFIED: Primary | ICD-10-CM

## 2018-05-23 LAB
ALBUMIN SERPL-MCNC: 3.9 G/DL (ref 3.5–4.7)
ALBUMIN/GLOB SERPL: 1.9 {RATIO} (ref 1.2–2.2)
ALP SERPL-CCNC: 71 IU/L (ref 39–117)
ALT SERPL-CCNC: 8 IU/L (ref 0–32)
APPEARANCE UR: ABNORMAL
AST SERPL-CCNC: 11 IU/L (ref 0–40)
BACTERIA #/AREA URNS HPF: ABNORMAL /[HPF]
BASOPHILS # BLD AUTO: 0 X10E3/UL (ref 0–0.2)
BASOPHILS NFR BLD AUTO: 0 %
BILIRUB SERPL-MCNC: 0.3 MG/DL (ref 0–1.2)
BILIRUB UR QL STRIP: NEGATIVE
BUN SERPL-MCNC: 15 MG/DL (ref 8–27)
BUN/CREAT SERPL: 19 (ref 12–28)
CALCIUM SERPL-MCNC: 9.2 MG/DL (ref 8.7–10.3)
CASTS URNS QL MICRO: ABNORMAL /LPF
CHLORIDE SERPL-SCNC: 104 MMOL/L (ref 96–106)
CO2 SERPL-SCNC: 27 MMOL/L (ref 18–29)
COLOR UR: YELLOW
CREAT SERPL-MCNC: 0.8 MG/DL (ref 0.57–1)
EOSINOPHIL # BLD AUTO: 0.3 X10E3/UL (ref 0–0.4)
EOSINOPHIL NFR BLD AUTO: 5 %
EPI CELLS #/AREA URNS HPF: ABNORMAL /HPF
ERYTHROCYTE [DISTWIDTH] IN BLOOD BY AUTOMATED COUNT: 13.5 % (ref 12.3–15.4)
GFR SERPLBLD CREATININE-BSD FMLA CKD-EPI: 66 ML/MIN/1.73
GFR SERPLBLD CREATININE-BSD FMLA CKD-EPI: 76 ML/MIN/1.73
GLOBULIN SER CALC-MCNC: 2.1 G/DL (ref 1.5–4.5)
GLUCOSE SERPL-MCNC: 69 MG/DL (ref 65–99)
GLUCOSE UR QL: NEGATIVE
HCT VFR BLD AUTO: 40.5 % (ref 34–46.6)
HGB BLD-MCNC: 13.2 G/DL (ref 11.1–15.9)
HGB UR QL STRIP: ABNORMAL
IMM GRANULOCYTES # BLD: 0 X10E3/UL (ref 0–0.1)
IMM GRANULOCYTES NFR BLD: 0 %
KETONES UR QL STRIP: NEGATIVE
LEUKOCYTE ESTERASE UR QL STRIP: ABNORMAL
LYMPHOCYTES # BLD AUTO: 2.4 X10E3/UL (ref 0.7–3.1)
LYMPHOCYTES NFR BLD AUTO: 36 %
MCH RBC QN AUTO: 31.7 PG (ref 26.6–33)
MCHC RBC AUTO-ENTMCNC: 32.6 G/DL (ref 31.5–35.7)
MCV RBC AUTO: 97 FL (ref 79–97)
MICRO URNS: ABNORMAL
MONOCYTES # BLD AUTO: 0.7 X10E3/UL (ref 0.1–0.9)
MONOCYTES NFR BLD AUTO: 10 %
MUCOUS THREADS URNS QL MICRO: PRESENT
NEUTROPHILS # BLD AUTO: 3.3 X10E3/UL (ref 1.4–7)
NEUTROPHILS NFR BLD AUTO: 49 %
NITRITE UR QL STRIP: NEGATIVE
PH UR STRIP: 5.5 [PH] (ref 5–7.5)
PLATELET # BLD AUTO: 259 X10E3/UL (ref 150–379)
POTASSIUM SERPL-SCNC: 4.8 MMOL/L (ref 3.5–5.2)
PROT SERPL-MCNC: 6 G/DL (ref 6–8.5)
PROT UR QL STRIP: NEGATIVE
RBC # BLD AUTO: 4.16 X10E6/UL (ref 3.77–5.28)
RBC #/AREA URNS HPF: ABNORMAL /HPF
SODIUM SERPL-SCNC: 141 MMOL/L (ref 134–144)
SP GR UR: 1.02 (ref 1–1.03)
TSH SERPL DL<=0.005 MIU/L-ACNC: 5.89 UIU/ML (ref 0.45–4.5)
UROBILINOGEN UR STRIP-MCNC: 0.2 MG/DL (ref 0.2–1)
WBC # BLD AUTO: 6.7 X10E3/UL (ref 3.4–10.8)
WBC #/AREA URNS HPF: >30 /HPF

## 2018-05-23 PROCEDURE — 87086 URINE CULTURE/COLONY COUNT: CPT

## 2018-05-23 PROCEDURE — 87186 SC STD MICRODIL/AGAR DIL: CPT

## 2018-05-23 PROCEDURE — 87088 URINE BACTERIA CULTURE: CPT

## 2018-05-23 NOTE — PROGRESS NOTES
Spoke with pt's daughter [facility] - advised her pt her urinalysis shows she may have a uti. Denies any symptoms at this time. MD recommends she return for urine culture - if positive will treat. MD will review rest of labs following return of urine culture. She has requested lab slip be faxed to [facility]. Faxed to [phone]. Confirmation received.

## 2018-05-23 NOTE — PROGRESS NOTES
Call daughter- There may be a uti. If symptoms are present obtain culture and start treatment. If no symptoms, obtain culture and treat only if positive.    - Will review rest of labs following return of urine culture

## 2018-05-25 LAB — BACTERIA UR CULT: ABNORMAL

## 2018-05-25 RX ORDER — LEVOTHYROXINE SODIUM 100 UG/1
100 TABLET ORAL
Qty: 90 TAB | Refills: 0 | Status: SHIPPED | OUTPATIENT
Start: 2018-05-25 | End: 2018-09-04 | Stop reason: SDUPTHER

## 2018-05-25 RX ORDER — CEFUROXIME AXETIL 250 MG/1
250 TABLET ORAL 2 TIMES DAILY
Qty: 10 TAB | Refills: 0 | Status: SHIPPED | OUTPATIENT
Start: 2018-05-25 | End: 2018-05-30

## 2018-05-25 NOTE — PROGRESS NOTES
Advised pt's daughter Antonina Pulse - pt has a uti, start ceftin 250 mg bid x 5 days. MD has changed levothyroxine to 100 mcg every day and stop the 88 mcg dosage. Both have been sent to pharmacy.

## 2018-05-25 NOTE — PROGRESS NOTES
Call daughter-   1- she has a uti, start ceftin 250 mg bid x 5 d  2- change levothyroxine to 100 mcg every day and stop the 88 mcg dosage

## 2018-06-12 ENCOUNTER — TELEPHONE (OUTPATIENT)
Dept: INTERNAL MEDICINE CLINIC | Age: 83
End: 2018-06-12

## 2018-06-12 NOTE — TELEPHONE ENCOUNTER
Per MD - spoke with pt's daughter Issa Davis - advised her that MD received fax from [facility] in regards to pt's Aricept 5 mg was discontinued. He wanted to check to see if she is off med? She states yes - that Dr Jose Martin Huston discontinued.  Will forward to MD.

## 2018-09-04 RX ORDER — LEVOTHYROXINE SODIUM 100 UG/1
TABLET ORAL
Qty: 90 TAB | Refills: 0 | Status: SHIPPED | OUTPATIENT
Start: 2018-09-04 | End: 2018-09-28 | Stop reason: SDUPTHER

## 2018-09-28 ENCOUNTER — TELEPHONE (OUTPATIENT)
Dept: INTERNAL MEDICINE CLINIC | Age: 83
End: 2018-09-28

## 2018-09-29 RX ORDER — LEVOTHYROXINE SODIUM 100 UG/1
TABLET ORAL
Qty: 90 TAB | Refills: 0 | Status: SHIPPED | OUTPATIENT
Start: 2018-09-29 | End: 2019-02-14

## 2018-09-29 RX ORDER — CYANOCOBALAMIN (VITAMIN B-12) 500 MCG
TABLET ORAL
Qty: 90 TAB | Refills: 0 | Status: SHIPPED | OUTPATIENT
Start: 2018-09-29 | End: 2018-11-29 | Stop reason: SDUPTHER

## 2018-09-29 RX ORDER — LOSARTAN POTASSIUM 50 MG/1
TABLET ORAL
Qty: 90 TAB | Refills: 0 | Status: SHIPPED | OUTPATIENT
Start: 2018-09-29 | End: 2018-11-29 | Stop reason: SDUPTHER

## 2018-10-08 NOTE — TELEPHONE ENCOUNTER
Due for office visit, pended for MD approval, LOV 5/22/18, no future appt scheduled. [place] [place] message sent for appt reminder.

## 2018-10-09 RX ORDER — CITALOPRAM 20 MG/1
TABLET, FILM COATED ORAL
Qty: 30 TAB | Refills: 0 | Status: SHIPPED | OUTPATIENT
Start: 2018-10-09 | End: 2018-11-29 | Stop reason: SDUPTHER

## 2018-10-12 ENCOUNTER — HOSPITAL ENCOUNTER (EMERGENCY)
Age: 83
Discharge: HOME | End: 2018-10-13
Attending: EMERGENCY MEDICINE
Payer: MEDICARE

## 2018-10-12 ENCOUNTER — APPOINTMENT (OUTPATIENT)
Dept: CT IMAGING | Age: 83
End: 2018-10-12
Attending: EMERGENCY MEDICINE
Payer: MEDICARE

## 2018-10-12 DIAGNOSIS — R19.7 DIARRHEA, UNSPECIFIED TYPE: Primary | ICD-10-CM

## 2018-10-12 DIAGNOSIS — R11.2 NAUSEA AND VOMITING, INTRACTABILITY OF VOMITING NOT SPECIFIED, UNSPECIFIED VOMITING TYPE: ICD-10-CM

## 2018-10-12 LAB
ALBUMIN SERPL-MCNC: 3.3 G/DL (ref 3.5–5)
ALBUMIN/GLOB SERPL: 0.8 {RATIO} (ref 1.1–2.2)
ALP SERPL-CCNC: 69 U/L (ref 45–117)
ALT SERPL-CCNC: 22 U/L (ref 12–78)
ANION GAP SERPL CALC-SCNC: 11 MMOL/L (ref 5–15)
APPEARANCE UR: ABNORMAL
AST SERPL-CCNC: 14 U/L (ref 15–37)
BACTERIA URNS QL MICRO: ABNORMAL /HPF
BASOPHILS # BLD: 0 K/UL (ref 0–0.1)
BASOPHILS NFR BLD: 0 % (ref 0–1)
BILIRUB SERPL-MCNC: 0.4 MG/DL (ref 0.2–1)
BILIRUB UR QL CFM: NEGATIVE
BUN SERPL-MCNC: 15 MG/DL (ref 6–20)
BUN/CREAT SERPL: 15 (ref 12–20)
CALCIUM SERPL-MCNC: 8.6 MG/DL (ref 8.5–10.1)
CHLORIDE SERPL-SCNC: 99 MMOL/L (ref 97–108)
CO2 SERPL-SCNC: 23 MMOL/L (ref 21–32)
COLOR UR: ABNORMAL
COMMENT, HOLDF: NORMAL
CREAT SERPL-MCNC: 1 MG/DL (ref 0.55–1.02)
DIFFERENTIAL METHOD BLD: NORMAL
EOSINOPHIL # BLD: 0.3 K/UL (ref 0–0.4)
EOSINOPHIL NFR BLD: 4 % (ref 0–7)
EPITH CASTS URNS QL MICRO: ABNORMAL /LPF
ERYTHROCYTE [DISTWIDTH] IN BLOOD BY AUTOMATED COUNT: 13 % (ref 11.5–14.5)
GLOBULIN SER CALC-MCNC: 3.9 G/DL (ref 2–4)
GLUCOSE SERPL-MCNC: 121 MG/DL (ref 65–100)
GLUCOSE UR STRIP.AUTO-MCNC: NEGATIVE MG/DL
HCT VFR BLD AUTO: 42 % (ref 35–47)
HGB BLD-MCNC: 14.1 G/DL (ref 11.5–16)
HGB UR QL STRIP: ABNORMAL
HYALINE CASTS URNS QL MICRO: ABNORMAL /LPF (ref 0–5)
IMM GRANULOCYTES # BLD: 0 K/UL (ref 0–0.04)
IMM GRANULOCYTES NFR BLD AUTO: 0 % (ref 0–0.5)
KETONES UR QL STRIP.AUTO: NEGATIVE MG/DL
LEUKOCYTE ESTERASE UR QL STRIP.AUTO: NEGATIVE
LIPASE SERPL-CCNC: 88 U/L (ref 73–393)
LYMPHOCYTES # BLD: 1.2 K/UL (ref 0.8–3.5)
LYMPHOCYTES NFR BLD: 18 % (ref 12–49)
MCH RBC QN AUTO: 32.5 PG (ref 26–34)
MCHC RBC AUTO-ENTMCNC: 33.6 G/DL (ref 30–36.5)
MCV RBC AUTO: 96.8 FL (ref 80–99)
MONOCYTES # BLD: 0.8 K/UL (ref 0–1)
MONOCYTES NFR BLD: 12 % (ref 5–13)
NEUTS SEG # BLD: 4.1 K/UL (ref 1.8–8)
NEUTS SEG NFR BLD: 65 % (ref 32–75)
NITRITE UR QL STRIP.AUTO: POSITIVE
NRBC # BLD: 0 K/UL (ref 0–0.01)
NRBC BLD-RTO: 0 PER 100 WBC
PH UR STRIP: 5.5 [PH] (ref 5–8)
PLATELET # BLD AUTO: 201 K/UL (ref 150–400)
PMV BLD AUTO: 10.2 FL (ref 8.9–12.9)
POTASSIUM SERPL-SCNC: 3.4 MMOL/L (ref 3.5–5.1)
PROT SERPL-MCNC: 7.2 G/DL (ref 6.4–8.2)
PROT UR STRIP-MCNC: 30 MG/DL
RBC # BLD AUTO: 4.34 M/UL (ref 3.8–5.2)
RBC #/AREA URNS HPF: ABNORMAL /HPF (ref 0–5)
SAMPLES BEING HELD,HOLD: NORMAL
SODIUM SERPL-SCNC: 133 MMOL/L (ref 136–145)
SP GR UR REFRACTOMETRY: 1.02 (ref 1–1.03)
UR CULT HOLD, URHOLD: NORMAL
UROBILINOGEN UR QL STRIP.AUTO: 0.2 EU/DL (ref 0.2–1)
WBC # BLD AUTO: 6.4 K/UL (ref 3.6–11)
WBC URNS QL MICRO: ABNORMAL /HPF (ref 0–4)

## 2018-10-12 PROCEDURE — 83690 ASSAY OF LIPASE: CPT | Performed by: EMERGENCY MEDICINE

## 2018-10-12 PROCEDURE — 36415 COLL VENOUS BLD VENIPUNCTURE: CPT | Performed by: EMERGENCY MEDICINE

## 2018-10-12 PROCEDURE — 77030011943

## 2018-10-12 PROCEDURE — 81001 URINALYSIS AUTO W/SCOPE: CPT | Performed by: EMERGENCY MEDICINE

## 2018-10-12 PROCEDURE — 99283 EMERGENCY DEPT VISIT LOW MDM: CPT

## 2018-10-12 PROCEDURE — 87077 CULTURE AEROBIC IDENTIFY: CPT | Performed by: EMERGENCY MEDICINE

## 2018-10-12 PROCEDURE — 85025 COMPLETE CBC W/AUTO DIFF WBC: CPT | Performed by: EMERGENCY MEDICINE

## 2018-10-12 PROCEDURE — 74176 CT ABD & PELVIS W/O CONTRAST: CPT

## 2018-10-12 PROCEDURE — 87086 URINE CULTURE/COLONY COUNT: CPT | Performed by: EMERGENCY MEDICINE

## 2018-10-12 PROCEDURE — 80053 COMPREHEN METABOLIC PANEL: CPT | Performed by: EMERGENCY MEDICINE

## 2018-10-12 PROCEDURE — 87186 SC STD MICRODIL/AGAR DIL: CPT | Performed by: EMERGENCY MEDICINE

## 2018-10-12 PROCEDURE — 96360 HYDRATION IV INFUSION INIT: CPT

## 2018-10-12 PROCEDURE — 74011250636 HC RX REV CODE- 250/636: Performed by: EMERGENCY MEDICINE

## 2018-10-12 RX ORDER — ONDANSETRON 4 MG/1
4 TABLET, ORALLY DISINTEGRATING ORAL
Qty: 12 TAB | Refills: 0 | Status: SHIPPED | OUTPATIENT
Start: 2018-10-12 | End: 2019-02-14 | Stop reason: ALTCHOICE

## 2018-10-12 RX ADMIN — SODIUM CHLORIDE 500 ML: 900 INJECTION, SOLUTION INTRAVENOUS at 23:28

## 2018-10-13 VITALS
WEIGHT: 140 LBS | HEART RATE: 78 BPM | OXYGEN SATURATION: 96 % | HEIGHT: 64 IN | BODY MASS INDEX: 23.9 KG/M2 | TEMPERATURE: 98.3 F | RESPIRATION RATE: 16 BRPM | DIASTOLIC BLOOD PRESSURE: 69 MMHG | SYSTOLIC BLOOD PRESSURE: 113 MMHG

## 2018-10-13 NOTE — ED PROVIDER NOTES
HPI Comments: 80 y.o. female with past medical history significant for UTI and thyroid disease who presents from home via personal vehicle with chief complaint of diarrhea. Relative reports the pt described an onset of diarrhea (with an episode \"about every hour & a half\") about three days ago. Pt was seen earlier today by a medical team who recommended she go to to the ED if pt began emesis. Relative stated that the patient had problems eating and drinking today and that she did end up vomiting once, at which point they came to the ED. Care giver also states that the pt's diarrhea has slowed down over the past few days. Care giver also stated that the pt was diagnosed with a UTI about three weeks ago and was prescribed Keflex, which she stopped taking around 10/2/18. Pt denies any fever, chills, abdominal pain, hematuria, hematochezia, or foul odors from urine. There are no other acute medical concerns at this time. Social hx: non-smoker, non-drinker PCP: Jac Rosa MD 
 
Note written by Farnaz Cheung, as dictated by Katerina Hernandez MD 11:01 PM 
 
 
The history is provided by a relative and the patient. No  was used. Past Medical History:  
Diagnosis Date  Basal cell carcinoma   
 face  Cataract   
 bilateral  
 Depression  Muscle ache  Muscle pain  Stiff joint  Thyroid disease Past Surgical History:  
Procedure Laterality Date  HX CATARACT REMOVAL    
 bilateral  
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  HX OTHER SURGICAL    
 basal cell removal on face  HX OTHER SURGICAL    
 stapedectomy-not sure which ear Family History:  
Problem Relation Age of Onset  COPD Mother Social History Social History  Marital status:  Spouse name: N/A  
 Number of children: N/A  
 Years of education: N/A Occupational History  Not on file. Social History Main Topics  Smoking status: Never Smoker  Smokeless tobacco: Never Used  Alcohol use No  
 Drug use: Not on file  Sexual activity: No  
 
Other Topics Concern  Not on file Social History Narrative ALLERGIES: Review of patient's allergies indicates no known allergies. Review of Systems Constitutional: Negative for chills, diaphoresis and fever. HENT: Negative for congestion, postnasal drip, rhinorrhea and sore throat. Eyes: Negative for photophobia, discharge, redness and visual disturbance. Respiratory: Negative for cough, chest tightness, shortness of breath and wheezing. Cardiovascular: Negative for chest pain, palpitations and leg swelling. Gastrointestinal: Positive for diarrhea and vomiting. Negative for abdominal distention, abdominal pain, blood in stool, constipation and nausea. Genitourinary: Negative for difficulty urinating, dysuria, frequency, hematuria and urgency. Musculoskeletal: Negative for arthralgias, back pain, joint swelling and myalgias. Skin: Negative for color change and rash. Neurological: Negative for dizziness, speech difficulty, weakness, light-headedness, numbness and headaches. Psychiatric/Behavioral: Negative for confusion. The patient is not nervous/anxious. All other systems reviewed and are negative. Vitals:  
 10/12/18 2145 BP: 111/66 Pulse: 84 Resp: 16 Temp: 98.5 °F (36.9 °C) SpO2: 95% Weight: 63.5 kg (140 lb) Height: 5' 4\" (1.626 m) Physical Exam  
Constitutional: She is oriented to person, place, and time. She appears well-developed and well-nourished. No distress. HENT:  
Head: Normocephalic and atraumatic. Right Ear: External ear normal.  
Left Ear: External ear normal.  
Nose: Nose normal.  
Mouth/Throat: Oropharynx is clear and moist.  
Eyes: Conjunctivae and EOM are normal. Pupils are equal, round, and reactive to light. No scleral icterus. Neck: Normal range of motion. Neck supple. No JVD present. No tracheal deviation present. No thyromegaly present. Cardiovascular: Normal rate, regular rhythm and normal heart sounds. Exam reveals no gallop and no friction rub. No murmur heard. Pulmonary/Chest: Effort normal and breath sounds normal. No respiratory distress. She has no wheezes. She has no rales. She exhibits no tenderness. Abdominal: Soft. She exhibits no distension and no mass. There is no tenderness. There is no rebound and no guarding. Hyperactive bowel sounds. Musculoskeletal: Normal range of motion. She exhibits no edema or tenderness. Lymphadenopathy:  
  She has no cervical adenopathy. Neurological: She is alert and oriented to person, place, and time. She has normal strength. She displays no atrophy and no tremor. No cranial nerve deficit. She exhibits normal muscle tone. Coordination and gait normal.  
Skin: Skin is warm and dry. No rash noted. She is not diaphoretic. No erythema. Poor skin turgor. Psychiatric: She has a normal mood and affect. Her behavior is normal. Judgment and thought content normal.  
Nursing note and vitals reviewed. Note written by Farnaz Caceres, as dictated by Arianna Lainez MD 11:00 PM. MDM Number of Diagnoses or Management Options Diarrhea, unspecified type:  
Nausea and vomiting, intractability of vomiting not specified, unspecified vomiting type:  
Diagnosis management comments: BRANDO Impression: 59-year-old female presenting to the emergency department accompanied by family member with a history of chronic diarrhea over the last 3-4 days now had one episode of vomiting. As noted in the history of present illness it appears that perhaps dispatch also summoned to the home as part of their instructions if she were to vomit she was to come to the emergency department thus prompting the ED visit.  
 
There is a history of recent antibiotic use possibility of C. difficile exists. In addition the patient appears dehydrated. Differential includes viral gastroenteritis, consider dehydration, electrolyte abnormalities, urinary tract infection. Due to the recent antibiotic use consider C. difficile. Plan of care we baseline labs, IV fluid hydration CT scan of the abdomen and due to her age we'll do a noncontrast study. We'll treat accordingly. ED Course Procedures PROGRESS NOTE: 
11:44 PM 
Pt and family agrees to wait for the urine culture before starting any antibiotic treatment. 11:45 PM 
Patient's results have been reviewed with them. Patient and/or family have verbally conveyed their understanding and agreement of the patient's signs, symptoms, diagnosis, treatment and prognosis and additionally agree to follow up as recommended or return to the Emergency Room should their condition change prior to follow-up. Discharge instructions have also been provided to the patient with some educational information regarding their diagnosis as well a list of reasons why they would want to return to the ER prior to their follow-up appointment should their condition change. 11:51 PM 
Discussed with family the urine shows bacteria but no white cells likely to represent colonization. In fact the daughter stated that in the past her urine cultures have been negative. At this time we'll not treat with antibiotics we'll await urine culture and treat the diarrhea nausea and vomiting with the usual symptomatic measures, good sign is that the stool is starting to form.

## 2018-10-13 NOTE — ED TRIAGE NOTES
Patient arrives to the ED with c/o vomiting and diarrhea since Wednesday, no fever noted, patient tried no OTC medications, lab work done today by [facility], no abdo pain noted, no other complaint,

## 2018-10-13 NOTE — DISCHARGE INSTRUCTIONS
Diarrhea: Care Instructions  Your Care Instructions    Diarrhea is loose, watery stools (bowel movements). The exact cause is often hard to find. Sometimes diarrhea is your body's way of getting rid of what caused an upset stomach. Viruses, food poisoning, and many medicines can cause diarrhea. Some people get diarrhea in response to emotional stress, anxiety, or certain foods. Almost everyone has diarrhea now and then. It usually isn't serious, and your stools will return to normal soon. The important thing to do is replace the fluids you have lost, so you can prevent dehydration. The doctor has checked you carefully, but problems can develop later. If you notice any problems or new symptoms, get medical treatment right away. Follow-up care is a key part of your treatment and safety. Be sure to make and go to all appointments, and call your doctor if you are having problems. It's also a good idea to know your test results and keep a list of the medicines you take. How can you care for yourself at home? · Watch for signs of dehydration, which means your body has lost too much water. Dehydration is a serious condition and should be treated right away. Signs of dehydration are:  ¨ Increasing thirst and dry eyes and mouth. ¨ Feeling faint or lightheaded. ¨ Darker urine, and a smaller amount of urine than normal.  · To prevent dehydration, drink plenty of fluids, enough so that your urine is light yellow or clear like water. Choose water and other caffeine-free clear liquids until you feel better. If you have kidney, heart, or liver disease and have to limit fluids, talk with your doctor before you increase the amount of fluids you drink. · Begin eating small amounts of mild foods the next day, if you feel like it. ¨ Try yogurt that has live cultures of Lactobacillus. (Check the label.)  ¨ Avoid spicy foods, fruits, alcohol, and caffeine until 48 hours after all symptoms are gone.   ¨ Avoid chewing gum that contains sorbitol. ¨ Avoid dairy products (except for yogurt with Lactobacillus) while you have diarrhea and for 3 days after symptoms are gone. · The doctor may recommend that you take over-the-counter medicine, such as loperamide (Imodium), if you still have diarrhea after 6 hours. Read and follow all instructions on the label. Do not use this medicine if you have bloody diarrhea, a high fever, or other signs of serious illness. Call your doctor if you think you are having a problem with your medicine. When should you call for help? Call 911 anytime you think you may need emergency care. For example, call if:    · You passed out (lost consciousness).     · Your stools are maroon or very bloody.    Call your doctor now or seek immediate medical care if:    · You are dizzy or lightheaded, or you feel like you may faint.     · Your stools are black and look like tar, or they have streaks of blood.     · You have new or worse belly pain.     · You have symptoms of dehydration, such as:  ¨ Dry eyes and a dry mouth. ¨ Passing only a little dark urine. ¨ Feeling thirstier than usual.     · You have a new or higher fever.    Watch closely for changes in your health, and be sure to contact your doctor if:    · Your diarrhea is getting worse.     · You see pus in the diarrhea.     · You are not getting better after 2 days (48 hours). Where can you learn more? Go to http://jamal-triny.info/. Enter U405 in the search box to learn more about \"Diarrhea: Care Instructions. \"  Current as of: November 20, 2017  Content Version: 11.8  © 7230-6218 [employer]. Care instructions adapted under license by [facility] (which disclaims liability or warranty for this information). If you have questions about a medical condition or this instruction, always ask your healthcare professional. [address] any warranty or liability for your use of this information.

## 2018-10-16 LAB
BACTERIA SPEC CULT: ABNORMAL
CC UR VC: ABNORMAL
SERVICE CMNT-IMP: ABNORMAL

## 2018-10-16 RX ORDER — CIPROFLOXACIN 250 MG/1
250 TABLET, FILM COATED ORAL EVERY 12 HOURS
Qty: 6 TAB | Refills: 0 | Status: SHIPPED | OUTPATIENT
Start: 2018-10-16 | End: 2018-10-19

## 2018-10-17 NOTE — PROGRESS NOTES
Results discussed with daughter who returned call. Pt with dementia so will not complain of urinary symptoms. Decision made to treat. Pt in [place]. ERX to [facility] for cipro 250 mg bid x 3 d

## 2018-11-07 ENCOUNTER — TELEPHONE (OUTPATIENT)
Dept: INTERNAL MEDICINE CLINIC | Age: 83
End: 2018-11-07

## 2018-11-07 NOTE — TELEPHONE ENCOUNTER
Carol Robins with [facility] [phone]     Calling to get clarification on the 6month medication update that Dr. Lara Bazzi signed off on and sent back. This time, the \"no dairy\" box was checked, which is a change from the past.  Was this intentional or a mistake? Please let Carol Robins know. Per message from nurse: 
 
Labs are at the bottom for each day in the reviews already. I added 18 as additional clinical and the inpatient recommendation. Nothing further to add. Misti  
 
 
  Patient Demographics     
  Patient Name [address] Sex  Address Phone    
  Ana Mehta [phone] Female 1957 [address] (Home) *Preferred* 
[phone] (Mobile)    
   
  CSN:    
  [id number]    
   
  Admit Date: Admit Time Room Bed    
  2018  9:20  [25579] 01 [25781]    
   
  Attending Providers     
  Provider Pager From To    
  Nannette Pang MD  18    
  Vitor Sevilla MD  18    
  Vignesh Vasques MD  18    
   
  Emergency Contact(s)     
  Name Relation Home Work Mobile    
  ROCHELLE Johns Spouse [phone]      
  Becki Grande Daughter [phone]      
   
Utilization Review  
  
  
  18 by Rimma Rock RN     
  Review Entered Review Status    
  2018 In Primary    
  Details    
    Assessment and Plan  
  
Blair Johns is a 64 y.o. female with a PMH of Atrial Fibrillation, CHF, COPD, mechanical valve replacement (s/p AVR and MVR) on Coumadin, Dyslipidemia, Migraine who is admitted for LLE swelling and SOB 
  
24 Hour Events: - Home Bumex 1 mg held starting  
- Per Pulm : Repear CXR- No acute process, RLE doppler: negative, added scheduled pulmicort/brovana, continue PO taper, Augmentin added, smoking cessation counseling  
  
Acute Hypoxic Respiratory Distress 2/2 likely COPD Exacerbation POA - Pt with known home O2 requirement (2L continuous) but has been unable to afford it, currently on 10 mg of Prednisone, Albuterol inhaler and Symbicort at home for COPD) - Duo Nebs scheduled q4h, mucinex - Prednisone 60mg daily --> solumedrol 80mg q8h on  --> prednisone 60mg daily on  - Throat culture negative, RVP negative - PT recommending home O2 continuous --> 6 min walk on  pt required 2 L O2 
- Per Pulm : Repear CXR- No acute process, RLE doppler: negative, added scheduled pulmicort/brovana, continue PO taper, Augmentin added, smoking cessation  
  
Chronic dHF not in acute exacerbation - ECHO 18 with EF 55%, no RWMA 
- Cardiology following, appreciate recs. - Home bumex 1mg held on  
  
Unilateral Left Lower Extremity Swelling POA - RESOLVED 
- Lower extremity prelim duplex- negative,  
- Patient unable to tolerate CTA to rule out PE 
  
Hypokalemia: K on POA: 2.3 RESOLVED -  Per chart review Hypokalemia is a recurrent problem for pt presumably from Bumex & Albuterol use- she takes klor-con 10 mEq M, W and F at home but dose will need readjustment on discharge - Replete PRN 
  
Persistent Atrial Fibrillation s/p AVR & MVR on Coumadin - Pt has been seen OP by Dr Kimberly Ko but states she has not followed up with him since 2016. INR supratherapeutic on admission (5.1) - Warfarin to be dosed by pharmacy for a INR goal of 2.5-3.5 - INR therapeutic today - Home Diltiazem 60 mg four times daily - Per cardiology request, pharmacy has done warfarin teaching with patient - Per pharmacy dc on regimen of warfarin alternating btw 1 mg and 2 mg with close outpatient f/u - Per cardiology, would recommend patient be on daily ASA once INR therapeutic and stable. Patient with hx of allergy to celecoxib, will defer to cardiology 
  Melanee Kehr - Nystatin- 4 times daily for thrust- swish and swallow  
  
Chronic Back Pain  
- Home Percocet 5-325 mg resumed- q6h PRN  
  
Hyperlipidemia (: TC: 196, T, HDL 88, VLDL 30 & LDL 78) - Home Lovastatin 10 mg  
  
FEN/GI - Regular diet. Activity - Ambulate as tolerated DVT prophylaxis - On [facility] prophylaxis - Not indicated at this time Fall prophylaxis - Not indicated at this time. Disposition - Admit to Remote Telemetry. Plan to d/c to Home.   
Code Status - DNR, discussed with patient / caregivers. Next of Denise Myles Name and Contact : Lynn Morales. Elif Womack [phone] 
  
  
LABS: WBC 18.2, INR 3.4, PT 4.3, GFRNAA 60, , ALK PHOS 121 MEDS: DUONEB Q4H, AUGMENTIN PO Q12H, COMPAZINE 5MG IV x1 VITALS: T98, P 107, RR 20, /85, 96% RA  
   
  
   
  INPATIENT RECOMMENDATION by Sandi Trejo RN     
  Review Entered Review Status    
  8/1/2018 In Primary    
  Details    
    Kandis Lundborg is a 64year old female who was seen in the ED on 7/26/2018 with complaint of left lower extremity swelling and shortness of breath. Her past medical history was significant for congestive heart failure, atrial fibrillation, COPD and mechanical valve on coumadin. Her home medications were prednisone, Percocet, Symbicort, albuterol, lovastatin, Bumex, Incruse, Cardizem, and Colase. Her vitals showed temperature was 98.4, pulse 105, respiratory rate 24 and blood pressure 146/67. Her physical examination was significant for decreased breath sounds in the bases, heart was irregularly irregular, she had 2+ edema. Her labs showed sodium 137, potassium 2.3, creatinine 0.84,, INR 5.1, white count 15.5, hematocrit 35.2, and platelets 768. She was admitted on 7/26/2018 with diagnosis of CHF vs COPD, Unilateral lower extremity edema,hypokalemia, atrial fibrillation, and supratheraputic INR. In the ED she received both IV and oral potassium 40meq oral and 10 IV, she got a second dose of 40 meq orally, she was started on DuoNebs and she was given increased oral prednisone. She had a viral panel done and throat culture. Cardiology consult was requested. She was seen on 7/26/2018 by Cardiology, they were concerned about her labile INR and planned on holding Coumadin until INR was stable. They also felt she needed a pharmacy consult. She was seen on 7/27/2018 her potassium was improved with replacement to 3.6.  Her breathing was improved with saturations % on 2 liters at rest. She was still having poor air movement with scattered wheezing. Her prednisone remained oral but if continued to not improve plan to change to IV. She was seen on 7/28/2018, she was feeling better and breathing better. Her vitals were stable. She was still on oxygen. She had poor airmovement and expiratory wheezing, somewhat better from day prior. Since she was not improving she was changed to IV steroids specifically 80mg IV every 8 hours. In summary, Laquita Domínguez is a 64year old female whose past medical history was significant for congestive heart failure, atrial fibrillation, COPD and mechanical valve on coumadin who was seen in the ED on 7/26/2018 with complaint of left lower extremity swelling and shortness of breath. She was admitted on 7/26/2018 with diagnosis of CHF vs COPD, Unilateral lower extremity edema,hypokalemia, atrial fibrillation, and supratheraputic INR. At this time she has spent over two midnights in the hospital. She has been on oxygen and frequent breathing treatments but has still failed to improve, her care has escalated and now includes IV steroids. As she has failed to improve and is requiring increasing intensity of care she does demonstrate medical necessity for her continued inpatient level of care. For Laquita Domínguez, we recommend inpatient. INPATIENT.

## 2018-11-08 NOTE — TELEPHONE ENCOUNTER
Spoke with [facility] with [facility] - advised her MD said likely his mistake but she should confirm with the daughter. She states she has call out to daughter as well.

## 2018-11-29 RX ORDER — CITALOPRAM 20 MG/1
TABLET, FILM COATED ORAL
Qty: 14 TAB | Refills: 0 | Status: SHIPPED | OUTPATIENT
Start: 2018-11-29 | End: 2018-11-30 | Stop reason: SDUPTHER

## 2018-11-29 RX ORDER — MEMANTINE HYDROCHLORIDE 21 MG/1
CAPSULE, EXTENDED RELEASE ORAL
Qty: 14 CAP | Refills: 0 | Status: SHIPPED | OUTPATIENT
Start: 2018-11-29 | End: 2019-02-14 | Stop reason: SDUPTHER

## 2018-11-29 RX ORDER — LOSARTAN POTASSIUM 50 MG/1
TABLET ORAL
Qty: 14 TAB | Refills: 0 | Status: SHIPPED | OUTPATIENT
Start: 2018-11-29 | End: 2018-11-30 | Stop reason: SDUPTHER

## 2018-11-29 RX ORDER — CYANOCOBALAMIN (VITAMIN B-12) 500 MCG
TABLET ORAL
Qty: 14 TAB | Refills: 0 | Status: SHIPPED | OUTPATIENT
Start: 2018-11-29 | End: 2019-04-10 | Stop reason: SDUPTHER

## 2018-11-30 RX ORDER — CITALOPRAM 20 MG/1
TABLET, FILM COATED ORAL
Qty: 90 TAB | Refills: 0 | Status: SHIPPED | OUTPATIENT
Start: 2018-11-30 | End: 2019-03-07 | Stop reason: SDUPTHER

## 2018-11-30 RX ORDER — MEMANTINE HYDROCHLORIDE 21 MG/1
CAPSULE, EXTENDED RELEASE ORAL
Qty: 90 CAP | Refills: 0 | Status: SHIPPED | OUTPATIENT
Start: 2018-11-30 | End: 2019-04-10

## 2018-11-30 RX ORDER — CHOLECALCIFEROL (VITAMIN D3) 125 MCG
2000 CAPSULE ORAL DAILY
Qty: 90 TAB | Refills: 0 | Status: SHIPPED | OUTPATIENT
Start: 2018-11-30 | End: 2019-03-07 | Stop reason: SDUPTHER

## 2018-11-30 RX ORDER — LOSARTAN POTASSIUM 50 MG/1
TABLET ORAL
Qty: 90 TAB | Refills: 0 | Status: SHIPPED | OUTPATIENT
Start: 2018-11-30 | End: 2019-07-17 | Stop reason: SDUPTHER

## 2019-02-04 ENCOUNTER — TELEPHONE (OUTPATIENT)
Dept: INTERNAL MEDICINE CLINIC | Age: 84
End: 2019-02-04

## 2019-02-04 ENCOUNTER — DOCUMENTATION ONLY (OUTPATIENT)
Dept: INTERNAL MEDICINE CLINIC | Age: 84
End: 2019-02-04

## 2019-02-04 DIAGNOSIS — E06.3 AUTOIMMUNE HYPOTHYROIDISM: ICD-10-CM

## 2019-02-04 DIAGNOSIS — E53.8 B12 DEFICIENCY: Primary | ICD-10-CM

## 2019-02-04 DIAGNOSIS — Z79.899 ENCOUNTER FOR LONG-TERM (CURRENT) USE OF MEDICATIONS: ICD-10-CM

## 2019-02-04 DIAGNOSIS — F02.818 LATE ONSET ALZHEIMER'S DISEASE WITH BEHAVIORAL DISTURBANCE (HCC): ICD-10-CM

## 2019-02-04 DIAGNOSIS — G30.1 LATE ONSET ALZHEIMER'S DISEASE WITH BEHAVIORAL DISTURBANCE (HCC): ICD-10-CM

## 2019-02-04 NOTE — PROGRESS NOTES
Faxed pt's lab slip to [facility] at [facility] location [phone]. Noted pt will be in on 2/11/19. Confirmation received.

## 2019-02-04 NOTE — TELEPHONE ENCOUNTER
Spoke with pt's daughter Samara Dawkins - advised her pt can have labs done Monday prior to her appt - she needs to be fasting. MD can review at her appt. She wanted to have labs done closer to home - [facility] at [facility]. Advised her I would fax lab slip to them. She can take pt there.

## 2019-02-04 NOTE — TELEPHONE ENCOUNTER
Regarding: Non-Urgent Medical Question  Contact: [phone]  ----- Message from [address] [place] [address] [address], Generic sent at 2/4/2019  3:26 PM EST -----    My mom, Susan Multani, has an appointment on 2/14 at 3:00 with Dr. Fauzia Reno. On 2/15 she will be traveling to [facility] to be with her other daughter for 2 months. Is there a way to schedule her lab work before her appointment? If there is a cancellation of another patient, is it possible to come in sooner than her 2/14 appointment?     Thanks,  Isai Ruelas  [phone]

## 2019-02-08 ENCOUNTER — HOSPITAL ENCOUNTER (OUTPATIENT)
Dept: LAB | Age: 84
Discharge: HOME | End: 2019-02-08
Payer: MEDICARE

## 2019-02-08 PROCEDURE — 82607 VITAMIN B-12: CPT

## 2019-02-08 PROCEDURE — 80053 COMPREHEN METABOLIC PANEL: CPT

## 2019-02-08 PROCEDURE — 36415 COLL VENOUS BLD VENIPUNCTURE: CPT

## 2019-02-08 PROCEDURE — 85025 COMPLETE CBC W/AUTO DIFF WBC: CPT

## 2019-02-08 PROCEDURE — 84443 ASSAY THYROID STIM HORMONE: CPT

## 2019-02-08 PROCEDURE — 81001 URINALYSIS AUTO W/SCOPE: CPT

## 2019-02-09 LAB
ALBUMIN SERPL-MCNC: 3.8 G/DL (ref 3.5–4.7)
ALBUMIN/GLOB SERPL: 1.5 {RATIO} (ref 1.2–2.2)
ALP SERPL-CCNC: 65 IU/L (ref 39–117)
ALT SERPL-CCNC: 10 IU/L (ref 0–32)
APPEARANCE UR: ABNORMAL
AST SERPL-CCNC: 15 IU/L (ref 0–40)
BACTERIA #/AREA URNS HPF: ABNORMAL /[HPF]
BASOPHILS # BLD AUTO: 0 X10E3/UL (ref 0–0.2)
BASOPHILS NFR BLD AUTO: 0 %
BILIRUB SERPL-MCNC: 0.5 MG/DL (ref 0–1.2)
BILIRUB UR QL STRIP: NEGATIVE
BUN SERPL-MCNC: 15 MG/DL (ref 8–27)
BUN/CREAT SERPL: 18 (ref 12–28)
CALCIUM SERPL-MCNC: 9.1 MG/DL (ref 8.7–10.3)
CASTS URNS QL MICRO: ABNORMAL /LPF
CHLORIDE SERPL-SCNC: 100 MMOL/L (ref 96–106)
CO2 SERPL-SCNC: 24 MMOL/L (ref 20–29)
COLOR UR: YELLOW
CREAT SERPL-MCNC: 0.83 MG/DL (ref 0.57–1)
CRYSTALS URNS MICRO: ABNORMAL
EOSINOPHIL # BLD AUTO: 0.3 X10E3/UL (ref 0–0.4)
EOSINOPHIL NFR BLD AUTO: 5 %
EPI CELLS #/AREA URNS HPF: ABNORMAL /HPF
ERYTHROCYTE [DISTWIDTH] IN BLOOD BY AUTOMATED COUNT: 13.5 % (ref 12.3–15.4)
GLOBULIN SER CALC-MCNC: 2.6 G/DL (ref 1.5–4.5)
GLUCOSE SERPL-MCNC: 93 MG/DL (ref 65–99)
GLUCOSE UR QL: NEGATIVE
HCT VFR BLD AUTO: 39 % (ref 34–46.6)
HGB BLD-MCNC: 12.8 G/DL (ref 11.1–15.9)
HGB UR QL STRIP: ABNORMAL
IMM GRANULOCYTES # BLD AUTO: 0 X10E3/UL (ref 0–0.1)
IMM GRANULOCYTES NFR BLD AUTO: 0 %
KETONES UR QL STRIP: NEGATIVE
LEUKOCYTE ESTERASE UR QL STRIP: ABNORMAL
LYMPHOCYTES # BLD AUTO: 3.1 X10E3/UL (ref 0.7–3.1)
LYMPHOCYTES NFR BLD AUTO: 49 %
MCH RBC QN AUTO: 31.4 PG (ref 26.6–33)
MCHC RBC AUTO-ENTMCNC: 32.8 G/DL (ref 31.5–35.7)
MCV RBC AUTO: 96 FL (ref 79–97)
MICRO URNS: ABNORMAL
MONOCYTES # BLD AUTO: 0.5 X10E3/UL (ref 0.1–0.9)
MONOCYTES NFR BLD AUTO: 8 %
MUCOUS THREADS URNS QL MICRO: PRESENT
NEUTROPHILS # BLD AUTO: 2.4 X10E3/UL (ref 1.4–7)
NEUTROPHILS NFR BLD AUTO: 38 %
NITRITE UR QL STRIP: POSITIVE
PH UR STRIP: 6.5 [PH] (ref 5–7.5)
PLATELET # BLD AUTO: 257 X10E3/UL (ref 150–379)
POTASSIUM SERPL-SCNC: 4.2 MMOL/L (ref 3.5–5.2)
PROT SERPL-MCNC: 6.4 G/DL (ref 6–8.5)
PROT UR QL STRIP: NEGATIVE
RBC # BLD AUTO: 4.07 X10E6/UL (ref 3.77–5.28)
RBC #/AREA URNS HPF: ABNORMAL /HPF
SODIUM SERPL-SCNC: 138 MMOL/L (ref 134–144)
SP GR UR: 1.02 (ref 1–1.03)
TSH SERPL DL<=0.005 MIU/L-ACNC: 9.78 UIU/ML (ref 0.45–4.5)
UNIDENT CRYS URNS QL MICRO: PRESENT
UROBILINOGEN UR STRIP-MCNC: 0.2 MG/DL (ref 0.2–1)
VIT B12 SERPL-MCNC: 1162 PG/ML (ref 232–1245)
WBC # BLD AUTO: 6.5 X10E3/UL (ref 3.4–10.8)
WBC #/AREA URNS HPF: >30 /HPF

## 2019-02-11 NOTE — PROGRESS NOTES
Call daughter Gilford Little- There may be a uti. If symptoms are present obtain culture and start treatment. If no symptoms, obtain culture and treat only if positive.    Will Review at followup

## 2019-02-11 NOTE — PROGRESS NOTES
Advised pt's daughter Michaela Mcginnis that pt's urinalysis shows she may have a uti. She states pt never has any symptoms. MD recommends she return for urine culture - if positive will treat. She states she is declining urine culture. Pt seems to always have uti and antibiotics \"tear up her stomach. \" She states she has full bottle of antibiotic from last time - did not give to pt. Pt has appt on 2/14/19.  Will forward to MD.

## 2019-02-14 ENCOUNTER — HOSPITAL ENCOUNTER (OUTPATIENT)
Dept: LAB | Age: 84
Discharge: HOME | End: 2019-02-14
Payer: MEDICARE

## 2019-02-14 ENCOUNTER — OFFICE VISIT (OUTPATIENT)
Dept: INTERNAL MEDICINE CLINIC | Age: 84
End: 2019-02-14

## 2019-02-14 VITALS
DIASTOLIC BLOOD PRESSURE: 64 MMHG | TEMPERATURE: 98.6 F | WEIGHT: 134.5 LBS | HEIGHT: 64 IN | SYSTOLIC BLOOD PRESSURE: 110 MMHG | OXYGEN SATURATION: 97 % | HEART RATE: 63 BPM | BODY MASS INDEX: 22.96 KG/M2 | RESPIRATION RATE: 18 BRPM

## 2019-02-14 DIAGNOSIS — Z23 ENCOUNTER FOR IMMUNIZATION: ICD-10-CM

## 2019-02-14 DIAGNOSIS — F32.9 REACTIVE DEPRESSION: ICD-10-CM

## 2019-02-14 DIAGNOSIS — N30.00 ACUTE CYSTITIS WITHOUT HEMATURIA: ICD-10-CM

## 2019-02-14 DIAGNOSIS — F02.818 LATE ONSET ALZHEIMER'S DISEASE WITH BEHAVIORAL DISTURBANCE (HCC): ICD-10-CM

## 2019-02-14 DIAGNOSIS — E06.3 AUTOIMMUNE HYPOTHYROIDISM: ICD-10-CM

## 2019-02-14 DIAGNOSIS — Z00.00 MEDICARE ANNUAL WELLNESS VISIT, SUBSEQUENT: Primary | ICD-10-CM

## 2019-02-14 DIAGNOSIS — G30.1 LATE ONSET ALZHEIMER'S DISEASE WITH BEHAVIORAL DISTURBANCE (HCC): ICD-10-CM

## 2019-02-14 PROCEDURE — 87186 SC STD MICRODIL/AGAR DIL: CPT

## 2019-02-14 PROCEDURE — 87086 URINE CULTURE/COLONY COUNT: CPT

## 2019-02-14 PROCEDURE — 87088 URINE BACTERIA CULTURE: CPT

## 2019-02-14 RX ORDER — LEVOTHYROXINE SODIUM 112 UG/1
TABLET ORAL
Qty: 90 TAB | Refills: 1 | Status: SHIPPED | OUTPATIENT
Start: 2019-02-14 | End: 2019-07-17 | Stop reason: SDUPTHER

## 2019-02-14 NOTE — PROGRESS NOTES
HISTORY OF PRESENT ILLNESS Pepper Reyes is a 80 y.o. female. HPI Subjective:  Pham Joseph is seen today for a Medicare wellness visit, as well as for review of chronic medical problems. She is accompanied by her daughter, who provides the vast majority of history given Zoila's dementia. 1. Medicare wellness visit. See attached note. 2. Preventive medicine. Reviewed today. She is up to date with labs, which I fully reviewed, and the TDAP booster. Other vaccinations appear up to date as well. 3. Chronic problems are reviewed. a. Hypothyroidism. Will increase Levothyroxine to 112 mcg. Check labs in two months. 
b. Dementia, unchanged. She follows up with psychiatry as directed. 4. New problems reviewed. a. UTI. Her daughter is very hesitant regarding UTI treatment given she has had bad GI side effects with most antibiotics tried. She has the further complication of leaving to stay with her other daughter for a few months in [address] [place] [address]. Reviewed with them will await a culture and then decide if she will be a candidate for the one dose Monurol versus an injection of Ceftriaxone. I would favor the latter. This would have to be arranged in [address] [place] [address]. Current Outpatient Medications Medication Sig  varicella-zoster recombinant, PF, (SHINGRIX, PF,) 50 mcg/0.5 mL susr injection 0.5mL by IntraMUSCular route once now and then repeat in 2-6 months  levothyroxine (SYNTHROID) 112 mcg tablet TAKE 1 TABLET BY MOUTH DAILY BEFORE BREAKFAST- new dose  cholecalciferol, vitamin D3, (VITAMIN D3) 2,000 unit tab Take 1 Tab by mouth daily.  losartan (COZAAR) 50 mg tablet TAKE ONE TABLET BY MOUTH EVERY DAY  citalopram (CELEXA) 20 mg tablet TAKE 1 TABLET BY MOUTH DAILY  memantine ER (NAMENDA XR) 21 mg capsule TAKE ONE CAPSULE BY MOUTH EVERY DAY (Patient taking differently: once capsule once a week)  VITAMIN B-12 500 mcg tablet TAKE ONE TABLET BY MOUTH EVERY DAY  
  LORazepam (ATIVAN) 0.5 mg tablet Take 1/2 to 1 tab by mouth every 8 hrs as needed for anxiety/agitation. No current facility-administered medications for this visit. Review of Systems Unable to perform ROS: Dementia Physical Exam  
Constitutional: She appears well-nourished. No distress. Neck: Carotid bruit is not present. Cardiovascular: Normal rate and regular rhythm. Exam reveals no gallop and no friction rub. No murmur heard. Pulmonary/Chest: Effort normal and breath sounds normal. No respiratory distress. Abdominal: Soft. She exhibits no distension. There is no tenderness. Musculoskeletal: She exhibits no edema. Psychiatric:  
Pleasantly confused Nursing note and vitals reviewed. ASSESSMENT and PLAN Diagnoses and all orders for this visit: 
 
1. Medicare annual wellness visit, subsequent 2. Reactive depression- Continue current regimen of prescription and / or OTC medications 3. Autoimmune hypothyroidism 
-     levothyroxine (SYNTHROID) 112 mcg tablet; TAKE 1 TABLET BY MOUTH DAILY BEFORE BREAKFAST- new dose 
-     TSH 3RD GENERATION 4. Late onset Alzheimer's disease with behavioral disturbance- See psychiatrist as directed , Continue current regimen of prescription and / or OTC medications 5. Encounter for immunization 
-     varicella-zoster recombinant, PF, (SHINGRIX, PF,) 50 mcg/0.5 mL susr injection; 0.5mL by IntraMUSCular route once now and then repeat in 2-6 months -     pneumococcal 13 davidson conj dip (PREVNAR-13) 0.5 mL syrg injection; 0.5 mL by IntraMUSCular route once for 1 dose.  
 
6. Acute cystitis without hematuria 
-     CULTURE, URINE

## 2019-02-14 NOTE — PATIENT INSTRUCTIONS
Medicare Wellness Visit, Female The best way to live healthy is to have a lifestyle where you eat a well-balanced diet, exercise regularly, limit alcohol use, and quit all forms of tobacco/nicotine, if applicable. Regular preventive services are another way to keep healthy. Preventive services (vaccines, screening tests, monitoring & exams) can help personalize your care plan, which helps you manage your own care. Screening tests can find health problems at the earliest stages, when they are easiest to treat. [facility] follows the current, evidence-based guidelines published by the [facility] ([facility]) when recommending preventive services for our patients. Because we follow these guidelines, sometimes recommendations change over time as research supports it. (For example, mammograms used to be recommended annually. Even though Medicare will still pay for an annual mammogram, the newer guidelines recommend a mammogram every two years for women of average risk.) Of course, you and your doctor may decide to screen more often for some diseases, based on your risk and your health status. Preventive services for you include: - Medicare offers their members a free annual wellness visit, which is time for you and your primary care provider to discuss and plan for your preventive service needs. Take advantage of this benefit every year! 
-All adults over the age of 72 should receive the recommended pneumonia vaccines. Current USPSTF guidelines recommend a series of two vaccines for the best pneumonia protection.  
-All adults should have a flu vaccine yearly and a tetanus vaccine every 10 years. All adults age 61 and older should receive a shingles vaccine once in their lifetime.   
-A bone mass density test is recommended when a woman turns 65 to screen for osteoporosis. This test is only recommended one time, as a screening. Some providers will use this same test as a disease monitoring tool if you already have osteoporosis. -All adults age 38-68 who are overweight should have a diabetes screening test once every three years.  
-Other screening tests and preventive services for persons with diabetes include: an eye exam to screen for diabetic retinopathy, a kidney function test, a foot exam, and stricter control over your cholesterol.  
-Cardiovascular screening for adults with routine risk involves an electrocardiogram (ECG) at intervals determined by your doctor.  
-Colorectal cancer screenings should be done for adults age 54-65 with no increased risk factors for colorectal cancer. There are a number of acceptable methods of screening for this type of cancer. Each test has its own benefits and drawbacks. Discuss with your doctor what is most appropriate for you during your annual wellness visit. The different tests include: colonoscopy (considered the best screening method), a fecal occult blood test, a fecal DNA test, and sigmoidoscopy. -Breast cancer screenings are recommended every other year for women of normal risk, age 54-69. 
-Cervical cancer screenings for women over age 72 are only recommended with certain risk factors.  
-All adults born between [facility] should be screened once for Hepatitis C. Here is a list of your current Health Maintenance items (your personalized list of preventive services) with a due date: 
Health Maintenance Due Topic Date Due  Shingles Vaccine (1 of 2) 01/15/1980  Bone Mineral Density   01/15/1995  Pneumococcal Vaccine (2 of 2 - PCV13) 09/30/2011  Flu Vaccine  08/01/2018

## 2019-02-17 LAB
BACTERIA UR CULT: ABNORMAL
BACTERIA UR CULT: ABNORMAL

## 2019-02-17 NOTE — PROGRESS NOTES
Call daughter- pt has a uti, options would be a one time injection vs one time oral medication Monurol

## 2019-02-18 NOTE — PROGRESS NOTES
Advised pt's daughter Mi Luo that pt has a uti. MD said the options would be a one time injection vs one time oral medication Monurol. She states pt is now in [place] for 2 months. Guess it will be oral medication.  Will forward to MD.

## 2019-02-19 RX ORDER — GRANULES FOR ORAL 3 G/1
3 POWDER ORAL ONCE
Qty: 1 PACKET | Refills: 0 | Status: SHIPPED | OUTPATIENT
Start: 2019-02-19 | End: 2019-02-19

## 2019-02-19 NOTE — PROGRESS NOTES
Spoke with pt's daughter Tyrone Gibbons to confirm that pharmacy in chart - [facility] in [facility] was where Rx needed to be sent. She states yes. Rx sent to pharmacy.

## 2019-03-07 RX ORDER — CHOLECALCIFEROL (VITAMIN D3) 125 MCG
CAPSULE ORAL
Qty: 90 TAB | Refills: 0 | Status: SHIPPED | OUTPATIENT
Start: 2019-03-07 | End: 2019-05-22 | Stop reason: SDUPTHER

## 2019-03-07 RX ORDER — CYANOCOBALAMIN (VITAMIN B-12) 500 MCG
TABLET ORAL
Qty: 90 TAB | Refills: 0 | Status: SHIPPED | OUTPATIENT
Start: 2019-03-07 | End: 2019-05-22 | Stop reason: SDUPTHER

## 2019-03-07 RX ORDER — CITALOPRAM 20 MG/1
TABLET, FILM COATED ORAL
Qty: 90 TAB | Refills: 0 | Status: SHIPPED | OUTPATIENT
Start: 2019-03-07 | End: 2019-11-29 | Stop reason: SDUPTHER

## 2019-03-26 ENCOUNTER — TELEPHONE (OUTPATIENT)
Dept: INTERNAL MEDICINE CLINIC | Age: 84
End: 2019-03-26

## 2019-03-26 NOTE — TELEPHONE ENCOUNTER
Spoke with pt's daughter Darleen (on HIPAA) in regards to fax MD received from [facility] . MD wanted to know about 3 medications listed: Potassium, Zofran and Naphcon. She states pt has never taken these medications. She did advise me that pt was slowly taken off her Namenda while in [place] with other daughter. She is now taking her Ativan 0.5 mg daily to help with anxiety but hopes to get her back to as needed bases. Pt is returning to her on April 15.  Will forward to MD.

## 2019-03-28 ENCOUNTER — TELEPHONE (OUTPATIENT)
Dept: INTERNAL MEDICINE CLINIC | Age: 84
End: 2019-03-28

## 2019-03-28 NOTE — TELEPHONE ENCOUNTER
Spoke with Jeane at the [facility] - she wanted to clarify the 3 medications mentioned on fax sent over for him to sign - potassium, zofran and naphcon. She states she has office notes from 2/14/19 with them on current outpatient meds. Advised her I did see what she had seen in note. Advised her I spoke with pt's daughter and clarified with her pt's list of medications. She states pt is not taking those 3 meds and did not remember her being on them.  So she can remove them from her list. Will forward to MD.

## 2019-04-03 ENCOUNTER — TELEPHONE (OUTPATIENT)
Dept: INTERNAL MEDICINE CLINIC | Age: 84
End: 2019-04-03

## 2019-04-03 DIAGNOSIS — N39.0 URINARY TRACT INFECTION WITHOUT HEMATURIA, SITE UNSPECIFIED: Primary | ICD-10-CM

## 2019-04-03 NOTE — TELEPHONE ENCOUNTER
Regarding: RE: Non-Urgent Medical Question  Contact: [phone]  ----- Message from [address] [place] [address] [address], Generic sent at 4/3/2019  9:51 AM EDT -----    Thanks Edwige Addison. I have arranged for mom to return to [facility] early and have scheduled an appt with Dr. Ana Krishna on Thursday 4/11. You will probably get a message from the  asking if a urinalysis order could be faxed to [facility] on [address]. I'd like to do that on 4/9. Thanks so much. Irene  ----- Message -----  From: Tawnya Fernandes LPN  Sent: 9/8/7232  9:21 AM EDT  To: Na Dawson  Subject: RE: Non-Urgent Cailin Pulido,  Dr Jose Antonio Morgan is out of office this week. He will return on 4/8/19. I have forward your message to him. Have a great day. Bernardino Flores    ----- Message -----     From: Na Dawson     Sent: 3/31/2019  1:28 PM EDT       To: Jen Baptiste MD  Subject: Non-Urgent Medical Question    Dr. Julio César Moses, [facility], is currently in [place] with my sister and we are believing that she has another UTI. The one-dose antibiotic that you prescribed and called in to the [place] [address] worked beautifully. Can mom go on something like that regularly? It seems like her new \"normal\" is going to be ongoing UTI's. Do we have to do urinalysis every time?     Thanks,  [employer]

## 2019-04-03 NOTE — TELEPHONE ENCOUNTER
Spoke with pt's daughter [facility]. [facility] pt returning home early from [place] - pt has a UTI. Symptoms are odor, pt getting up more at nite and increased confusion. She has scheduled an appt with MD on 4/11/19 at 3:25. Requesting we fax order for urine culture to [facility] [address] location. Advised will fax order today.  Will forward to MD.

## 2019-04-03 NOTE — TELEPHONE ENCOUNTER
Faxed lab slip for urinalysis and urine culture to [facility] [phone]. Noted pt will be coming in on 4/9/19. Confirmation received.

## 2019-04-09 ENCOUNTER — HOSPITAL ENCOUNTER (OUTPATIENT)
Dept: LAB | Age: 84
Discharge: HOME | DRG: 689 | End: 2019-04-09
Payer: MEDICARE

## 2019-04-09 PROCEDURE — 87088 URINE BACTERIA CULTURE: CPT

## 2019-04-09 PROCEDURE — 87186 SC STD MICRODIL/AGAR DIL: CPT

## 2019-04-09 PROCEDURE — 87086 URINE CULTURE/COLONY COUNT: CPT

## 2019-04-09 PROCEDURE — 81001 URINALYSIS AUTO W/SCOPE: CPT

## 2019-04-10 ENCOUNTER — APPOINTMENT (OUTPATIENT)
Dept: GENERAL RADIOLOGY | Age: 84
DRG: 689 | End: 2019-04-10
Attending: FAMILY MEDICINE
Payer: MEDICARE

## 2019-04-10 ENCOUNTER — APPOINTMENT (OUTPATIENT)
Dept: CT IMAGING | Age: 84
DRG: 689 | End: 2019-04-10
Attending: EMERGENCY MEDICINE
Payer: MEDICARE

## 2019-04-10 ENCOUNTER — HOSPITAL ENCOUNTER (INPATIENT)
Age: 84
LOS: 6 days | Discharge: NURSING, REHAB OR LONG-TERM CARE | DRG: 689 | End: 2019-04-16
Attending: EMERGENCY MEDICINE | Admitting: FAMILY MEDICINE
Payer: MEDICARE

## 2019-04-10 ENCOUNTER — APPOINTMENT (OUTPATIENT)
Dept: CT IMAGING | Age: 84
DRG: 689 | End: 2019-04-10
Attending: FAMILY MEDICINE
Payer: MEDICARE

## 2019-04-10 ENCOUNTER — TELEPHONE (OUTPATIENT)
Dept: INTERNAL MEDICINE CLINIC | Age: 84
End: 2019-04-10

## 2019-04-10 DIAGNOSIS — R31.9 URINARY TRACT INFECTION WITH HEMATURIA, SITE UNSPECIFIED: Primary | ICD-10-CM

## 2019-04-10 DIAGNOSIS — R41.0 CONFUSION: ICD-10-CM

## 2019-04-10 DIAGNOSIS — N39.0 URINARY TRACT INFECTION WITH HEMATURIA, SITE UNSPECIFIED: Primary | ICD-10-CM

## 2019-04-10 LAB
ALBUMIN SERPL-MCNC: 3.3 G/DL (ref 3.5–5)
ALBUMIN/GLOB SERPL: 0.9 {RATIO} (ref 1.1–2.2)
ALP SERPL-CCNC: 86 U/L (ref 45–117)
ALT SERPL-CCNC: 11 U/L (ref 12–78)
ANION GAP SERPL CALC-SCNC: 6 MMOL/L (ref 5–15)
APPEARANCE UR: ABNORMAL
APPEARANCE UR: ABNORMAL
AST SERPL-CCNC: 10 U/L (ref 15–37)
BACTERIA #/AREA URNS HPF: ABNORMAL /[HPF]
BACTERIA URNS QL MICRO: ABNORMAL /HPF
BASOPHILS # BLD: 0.1 K/UL (ref 0–0.1)
BASOPHILS NFR BLD: 1 % (ref 0–1)
BILIRUB SERPL-MCNC: 0.7 MG/DL (ref 0.2–1)
BILIRUB UR QL STRIP: NEGATIVE
BILIRUB UR QL: NEGATIVE
BUN SERPL-MCNC: 20 MG/DL (ref 6–20)
BUN/CREAT SERPL: 27 (ref 12–20)
CALCIUM SERPL-MCNC: 9.5 MG/DL (ref 8.5–10.1)
CASTS URNS QL MICRO: ABNORMAL /LPF
CHLORIDE SERPL-SCNC: 105 MMOL/L (ref 97–108)
CO2 SERPL-SCNC: 27 MMOL/L (ref 21–32)
COLOR UR: ABNORMAL
COLOR UR: YELLOW
COMMENT, HOLDF: NORMAL
CREAT SERPL-MCNC: 0.74 MG/DL (ref 0.55–1.02)
DIFFERENTIAL METHOD BLD: NORMAL
EOSINOPHIL # BLD: 0.3 K/UL (ref 0–0.4)
EOSINOPHIL NFR BLD: 3 % (ref 0–7)
EPI CELLS #/AREA URNS HPF: ABNORMAL /HPF (ref 0–10)
EPITH CASTS URNS QL MICRO: ABNORMAL /LPF
ERYTHROCYTE [DISTWIDTH] IN BLOOD BY AUTOMATED COUNT: 12.3 % (ref 11.5–14.5)
GLOBULIN SER CALC-MCNC: 3.5 G/DL (ref 2–4)
GLUCOSE SERPL-MCNC: 97 MG/DL (ref 65–100)
GLUCOSE UR QL: NEGATIVE
GLUCOSE UR STRIP.AUTO-MCNC: NEGATIVE MG/DL
HCT VFR BLD AUTO: 39 % (ref 35–47)
HGB BLD-MCNC: 12.5 G/DL (ref 11.5–16)
HGB UR QL STRIP: ABNORMAL
HGB UR QL STRIP: ABNORMAL
IMM GRANULOCYTES # BLD AUTO: 0 K/UL (ref 0–0.04)
IMM GRANULOCYTES NFR BLD AUTO: 0 % (ref 0–0.5)
KETONES UR QL STRIP.AUTO: 15 MG/DL
KETONES UR QL STRIP: NEGATIVE
LEUKOCYTE ESTERASE UR QL STRIP.AUTO: ABNORMAL
LEUKOCYTE ESTERASE UR QL STRIP: ABNORMAL
LYMPHOCYTES # BLD: 3 K/UL (ref 0.8–3.5)
LYMPHOCYTES NFR BLD: 33 % (ref 12–49)
MCH RBC QN AUTO: 31.2 PG (ref 26–34)
MCHC RBC AUTO-ENTMCNC: 32.1 G/DL (ref 30–36.5)
MCV RBC AUTO: 97.3 FL (ref 80–99)
MICRO URNS: ABNORMAL
MONOCYTES # BLD: 1 K/UL (ref 0–1)
MONOCYTES NFR BLD: 11 % (ref 5–13)
MUCOUS THREADS URNS QL MICRO: PRESENT
NEUTS SEG # BLD: 4.7 K/UL (ref 1.8–8)
NEUTS SEG NFR BLD: 52 % (ref 32–75)
NITRITE UR QL STRIP.AUTO: NEGATIVE
NITRITE UR QL STRIP: POSITIVE
NRBC # BLD: 0 K/UL (ref 0–0.01)
NRBC BLD-RTO: 0 PER 100 WBC
PH UR STRIP: 5.5 [PH] (ref 5–7.5)
PH UR STRIP: 6 [PH] (ref 5–8)
PLATELET # BLD AUTO: 235 K/UL (ref 150–400)
PMV BLD AUTO: 10.4 FL (ref 8.9–12.9)
POTASSIUM SERPL-SCNC: 3.9 MMOL/L (ref 3.5–5.1)
PROT SERPL-MCNC: 6.8 G/DL (ref 6.4–8.2)
PROT UR QL STRIP: NEGATIVE
PROT UR STRIP-MCNC: NEGATIVE MG/DL
RBC # BLD AUTO: 4.01 M/UL (ref 3.8–5.2)
RBC #/AREA URNS HPF: ABNORMAL /HPF (ref 0–2)
RBC #/AREA URNS HPF: ABNORMAL /HPF (ref 0–5)
SAMPLES BEING HELD,HOLD: NORMAL
SODIUM SERPL-SCNC: 138 MMOL/L (ref 136–145)
SP GR UR REFRACTOMETRY: 1.02 (ref 1–1.03)
SP GR UR: 1.01 (ref 1–1.03)
UROBILINOGEN UR QL STRIP.AUTO: 1 EU/DL (ref 0.2–1)
UROBILINOGEN UR STRIP-MCNC: 0.2 MG/DL (ref 0.2–1)
WBC # BLD AUTO: 9 K/UL (ref 3.6–11)
WBC #/AREA URNS HPF: >30 /HPF (ref 0–5)
WBC URNS QL MICRO: >100 /HPF (ref 0–4)
YEAST URNS QL MICRO: PRESENT

## 2019-04-10 PROCEDURE — 74011000258 HC RX REV CODE- 258: Performed by: EMERGENCY MEDICINE

## 2019-04-10 PROCEDURE — 85025 COMPLETE CBC W/AUTO DIFF WBC: CPT

## 2019-04-10 PROCEDURE — 87077 CULTURE AEROBIC IDENTIFY: CPT

## 2019-04-10 PROCEDURE — 87086 URINE CULTURE/COLONY COUNT: CPT

## 2019-04-10 PROCEDURE — 73501 X-RAY EXAM HIP UNI 1 VIEW: CPT

## 2019-04-10 PROCEDURE — 74011250636 HC RX REV CODE- 250/636: Performed by: EMERGENCY MEDICINE

## 2019-04-10 PROCEDURE — 77030011943

## 2019-04-10 PROCEDURE — 36415 COLL VENOUS BLD VENIPUNCTURE: CPT

## 2019-04-10 PROCEDURE — 93005 ELECTROCARDIOGRAM TRACING: CPT

## 2019-04-10 PROCEDURE — 65660000000 HC RM CCU STEPDOWN

## 2019-04-10 PROCEDURE — 99285 EMERGENCY DEPT VISIT HI MDM: CPT

## 2019-04-10 PROCEDURE — 74011250636 HC RX REV CODE- 250/636: Performed by: FAMILY MEDICINE

## 2019-04-10 PROCEDURE — 80053 COMPREHEN METABOLIC PANEL: CPT

## 2019-04-10 PROCEDURE — 72125 CT NECK SPINE W/O DYE: CPT

## 2019-04-10 PROCEDURE — 73552 X-RAY EXAM OF FEMUR 2/>: CPT

## 2019-04-10 PROCEDURE — 81001 URINALYSIS AUTO W/SCOPE: CPT

## 2019-04-10 PROCEDURE — 70450 CT HEAD/BRAIN W/O DYE: CPT

## 2019-04-10 PROCEDURE — 74011250637 HC RX REV CODE- 250/637: Performed by: FAMILY MEDICINE

## 2019-04-10 PROCEDURE — 77030039266 HC ADH SKN EXOFIN S2SG -A

## 2019-04-10 PROCEDURE — 87186 SC STD MICRODIL/AGAR DIL: CPT

## 2019-04-10 RX ORDER — SODIUM CHLORIDE 0.9 % (FLUSH) 0.9 %
5-40 SYRINGE (ML) INJECTION AS NEEDED
Status: DISCONTINUED | OUTPATIENT
Start: 2019-04-10 | End: 2019-04-16 | Stop reason: HOSPADM

## 2019-04-10 RX ORDER — LEVOTHYROXINE SODIUM 112 UG/1
112 TABLET ORAL
Status: DISCONTINUED | OUTPATIENT
Start: 2019-04-11 | End: 2019-04-16 | Stop reason: HOSPADM

## 2019-04-10 RX ORDER — SODIUM CHLORIDE 0.9 % (FLUSH) 0.9 %
5-40 SYRINGE (ML) INJECTION EVERY 8 HOURS
Status: DISCONTINUED | OUTPATIENT
Start: 2019-04-10 | End: 2019-04-16 | Stop reason: HOSPADM

## 2019-04-10 RX ORDER — LORAZEPAM 0.5 MG/1
0.5 TABLET ORAL
Status: DISCONTINUED | OUTPATIENT
Start: 2019-04-10 | End: 2019-04-16 | Stop reason: HOSPADM

## 2019-04-10 RX ORDER — LORAZEPAM 0.5 MG/1
0.5 TABLET ORAL
COMMUNITY
End: 2020-11-25 | Stop reason: SDUPTHER

## 2019-04-10 RX ORDER — ACETAMINOPHEN 325 MG/1
650 TABLET ORAL
Status: DISCONTINUED | OUTPATIENT
Start: 2019-04-10 | End: 2019-04-16 | Stop reason: HOSPADM

## 2019-04-10 RX ORDER — CITALOPRAM 20 MG/1
20 TABLET, FILM COATED ORAL DAILY
Status: DISCONTINUED | OUTPATIENT
Start: 2019-04-11 | End: 2019-04-16 | Stop reason: HOSPADM

## 2019-04-10 RX ORDER — SODIUM CHLORIDE 9 MG/ML
25 INJECTION, SOLUTION INTRAVENOUS CONTINUOUS
Status: DISCONTINUED | OUTPATIENT
Start: 2019-04-10 | End: 2019-04-16 | Stop reason: HOSPADM

## 2019-04-10 RX ORDER — FLUCONAZOLE 2 MG/ML
200 INJECTION, SOLUTION INTRAVENOUS EVERY 24 HOURS
Status: DISCONTINUED | OUTPATIENT
Start: 2019-04-10 | End: 2019-04-12

## 2019-04-10 RX ORDER — LOSARTAN POTASSIUM 50 MG/1
50 TABLET ORAL DAILY
Status: DISCONTINUED | OUTPATIENT
Start: 2019-04-11 | End: 2019-04-16 | Stop reason: HOSPADM

## 2019-04-10 RX ADMIN — Medication 10 ML: at 23:28

## 2019-04-10 RX ADMIN — CEFTRIAXONE 1 G: 1 INJECTION, POWDER, FOR SOLUTION INTRAMUSCULAR; INTRAVENOUS at 20:38

## 2019-04-10 RX ADMIN — FLUCONAZOLE 200 MG: 2 INJECTION, SOLUTION INTRAVENOUS at 22:16

## 2019-04-10 RX ADMIN — SODIUM CHLORIDE 75 ML/HR: 900 INJECTION, SOLUTION INTRAVENOUS at 23:28

## 2019-04-10 RX ADMIN — LORAZEPAM 0.5 MG: 0.5 TABLET ORAL at 23:28

## 2019-04-10 NOTE — ED TRIAGE NOTES
Pt arrives via EMS from home for c/o AMS. EMS reports pt had a unwitnessed GLF yesterday with no injuries noted - unknown if pt hit head or LOC. Today, around 1600, family noticed pt started to act \"differently/not herself. \" Symptoms include a decrease in ability to walk, perform ADLs and where pt normally mumbles verbally, pt has ceased to make any verbal mumbles. Daughter reports recent dx of UTI with no abx started. Hx of dementia - daughter reports pt has become increasingly more confused.

## 2019-04-10 NOTE — ED PROVIDER NOTES
80 y.o. female with past medical history significant for dementia, basal cell carcinoma, thyroid disease, depression, s/p hysterectomy, s/p cholecystectomy,  who presents to the ED via EMS accompanied by family member, with chief complaint of altered mental status. Family reports that the patient experienced an unwitnessed GLF yesterday. They are unsure if patient hit her head, but note that the patient has been increasingly confused since the fall. EMS personnel report that they were called to scene after patient \"became quiet and refused to move or walk around\" ~1600 this afternoon. They state that patient normally \"mumbles\" at baseline and will obey commands, but is not talkative normally. EMS also reports that patient is \"closing her eyes more\" which is abnormal per family. EMS states that patient exhibited no weakness, and family did not witness any numbness or weakness exhibited by the patient. Family notes that patient was \"pulling weeds\" a few hours PTA. They also reports that patient has exhibited increased confusion for ~1 week, even before fall yesterday, noting that patient has been \"waking up in the middle of the night\", and has been \"uncoordinated\". They deny that the patient has experienced any other falls, other than yesterday. Family states that patient was diagnosed with a UTI earlier today PTA, and has yet to start antibiotics. They deny that patient has any h/o strokes. They note that patient has additional h/o dementia. There are no other acute medical concerns at this time. Social hx: Negative for Tobacco use; Negative for EtOH use; 
PCP: Aracelis Rivera MD 
 
Full history, physical exam, and ROS unable to be obtained due to:  dementia. Note written by Farnaz Devries, as dictated by Mimi Avendano DO 5:36 PM 
 
The history is provided by medical records, the EMS personnel and a relative.  The history is limited by the condition of the patient (dementia). No  was used. Past Medical History:  
Diagnosis Date  Basal cell carcinoma   
 face  Cataract   
 bilateral  
 Depression  Muscle ache  Muscle pain  Stiff joint  Thyroid disease Past Surgical History:  
Procedure Laterality Date  HX CATARACT REMOVAL    
 bilateral  
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  HX OTHER SURGICAL    
 basal cell removal on face  HX OTHER SURGICAL    
 stapedectomy-not sure which ear Family History:  
Problem Relation Age of Onset  COPD Mother Social History Socioeconomic History  Marital status:  Spouse name: Not on file  Number of children: Not on file  Years of education: Not on file  Highest education level: Not on file Occupational History  Not on file Social Needs  Financial resource strain: Not on file  Food insecurity:  
  Worry: Not on file Inability: Not on file  Transportation needs:  
  Medical: Not on file Non-medical: Not on file Tobacco Use  Smoking status: Never Smoker  Smokeless tobacco: Never Used Substance and Sexual Activity  Alcohol use: No  
 Drug use: Not on file  Sexual activity: Never Lifestyle  Physical activity:  
  Days per week: Not on file Minutes per session: Not on file  Stress: Not on file Relationships  Social connections:  
  Talks on phone: Not on file Gets together: Not on file Attends [employer] service: Not on file Active member of club or organization: Not on file Attends meetings of clubs or organizations: Not on file Relationship status: Not on file  Intimate partner violence:  
  Fear of current or ex partner: Not on file Emotionally abused: Not on file Physically abused: Not on file Forced sexual activity: Not on file Other Topics Concern  Not on file Social History Narrative  Not on file ALLERGIES: Patient has no known allergies. Review of Systems Unable to perform ROS: Dementia Neurological: Positive for speech difficulty. Psychiatric/Behavioral: Positive for confusion. Vitals:  
 04/10/19 1744 BP: 127/59 Pulse: 72 Resp: 16 Temp: 98.3 °F (36.8 °C) SpO2: 96% Physical Exam  
Constitutional: She is oriented to person, place, and time. She appears well-developed and well-nourished. No distress. Pleasantly demented. Making intermittent mumbling noises. No audible speech. Smiles when recognized daughter. HENT:  
Head: Normocephalic and atraumatic. Nose: Nose normal.  
Mouth/Throat: Oropharynx is clear and moist.  
Eyes: Pupils are equal, round, and reactive to light. Conjunctivae and EOM are normal.  
Neck: Neck supple. Cardiovascular: Normal rate, regular rhythm and normal heart sounds. Pulmonary/Chest: Effort normal and breath sounds normal.  
Abdominal: Soft. She exhibits no distension. There is no tenderness. Musculoskeletal: She exhibits no edema or tenderness. No midline C, T or L spine tenderness. Pelvis stable and non-tender. No bony crepitus or deformity. Atraumatic extremities. Neurological: She is alert and oriented to person, place, and time. She has normal strength. No sensory deficit. Moving all four extremities equally. Intact strength in all four extremities. Exam limited to confusion and difficulty following commands. Skin: Skin is warm and dry. She is not diaphoretic. Nursing note and vitals reviewed. Note written by Farnaz Vega, as dictated by Enrique Low DO 5:36 PM 
 
MDM 
  80 y.o. female presents with acute on chronic confusion, fall. Exam as above. Labs were drawn and returned reassuringly. UA shows significant UTI. Sent for ctx. Given IV rocephin. Procedures ED EKG interpretation: 
Time: 19:09 Rhythm: normal sinus rhythm; Rate (approx.): 79 bpm; ST/T wave: no acute ST/T wave abnormalities suggestive of ischemia; Note written by Farnaz Solis, as dictated by Steph Levin DO 7:15 PM 
 
  
Hospitalist Yany for Admission 8:17 PM 
 
ED Room Number: US53/91 Patient Name and age:  Lydia Earing 80 y.o.  female Working Diagnosis: 1. Urinary tract infection with hematuria, site unspecified 2. Confusion Readmission: no 
Isolation Requirements:  no 
Recommended Level of Care:  med/surg Code Status:  DNR Other:  Hx of dementia, acute on chronic worsened confusion, family at the bedside. Given IV rocephin urine culture ordered. CONSULT NOTE: 
8:26 PM Steph Levin DO communicated with Dr. Kingsley Sorensen MD, Consult for Hospitalist via [facility] Text. Discussed available diagnostic tests and clinical findings. Dr. Nafisa Avendano will evaluate the patient for admission to the hospital. 
 
8:26 PM 
Patient is being admitted to the hospital.  The results of their tests and reasons for their admission have been discussed with them and/or available family. They convey agreement and understanding for the need to be admitted and for their admission diagnosis.

## 2019-04-11 ENCOUNTER — APPOINTMENT (OUTPATIENT)
Dept: CT IMAGING | Age: 84
DRG: 689 | End: 2019-04-11
Attending: HOSPITALIST
Payer: MEDICARE

## 2019-04-11 ENCOUNTER — APPOINTMENT (OUTPATIENT)
Dept: GENERAL RADIOLOGY | Age: 84
DRG: 689 | End: 2019-04-11
Attending: HOSPITALIST
Payer: MEDICARE

## 2019-04-11 LAB
ANION GAP SERPL CALC-SCNC: 3 MMOL/L (ref 5–15)
ATRIAL RATE: 79 BPM
BASOPHILS # BLD: 0 K/UL (ref 0–0.1)
BASOPHILS NFR BLD: 1 % (ref 0–1)
BUN SERPL-MCNC: 15 MG/DL (ref 6–20)
BUN/CREAT SERPL: 25 (ref 12–20)
CALCIUM SERPL-MCNC: 8.4 MG/DL (ref 8.5–10.1)
CALCULATED P AXIS, ECG09: 35 DEGREES
CALCULATED R AXIS, ECG10: 13 DEGREES
CALCULATED T AXIS, ECG11: 16 DEGREES
CHLORIDE SERPL-SCNC: 110 MMOL/L (ref 97–108)
CO2 SERPL-SCNC: 25 MMOL/L (ref 21–32)
CREAT SERPL-MCNC: 0.61 MG/DL (ref 0.55–1.02)
DIAGNOSIS, 93000: NORMAL
DIFFERENTIAL METHOD BLD: ABNORMAL
EOSINOPHIL # BLD: 0.3 K/UL (ref 0–0.4)
EOSINOPHIL NFR BLD: 5 % (ref 0–7)
ERYTHROCYTE [DISTWIDTH] IN BLOOD BY AUTOMATED COUNT: 12.2 % (ref 11.5–14.5)
GLUCOSE SERPL-MCNC: 87 MG/DL (ref 65–100)
HCT VFR BLD AUTO: 33.6 % (ref 35–47)
HGB BLD-MCNC: 10.8 G/DL (ref 11.5–16)
IMM GRANULOCYTES # BLD AUTO: 0 K/UL (ref 0–0.04)
IMM GRANULOCYTES NFR BLD AUTO: 0 % (ref 0–0.5)
LYMPHOCYTES # BLD: 3.3 K/UL (ref 0.8–3.5)
LYMPHOCYTES NFR BLD: 46 % (ref 12–49)
MCH RBC QN AUTO: 31.5 PG (ref 26–34)
MCHC RBC AUTO-ENTMCNC: 32.1 G/DL (ref 30–36.5)
MCV RBC AUTO: 98 FL (ref 80–99)
MONOCYTES # BLD: 0.9 K/UL (ref 0–1)
MONOCYTES NFR BLD: 13 % (ref 5–13)
NEUTS SEG # BLD: 2.4 K/UL (ref 1.8–8)
NEUTS SEG NFR BLD: 35 % (ref 32–75)
NRBC # BLD: 0 K/UL (ref 0–0.01)
NRBC BLD-RTO: 0 PER 100 WBC
P-R INTERVAL, ECG05: 116 MS
PLATELET # BLD AUTO: 201 K/UL (ref 150–400)
PMV BLD AUTO: 10.4 FL (ref 8.9–12.9)
POTASSIUM SERPL-SCNC: 3.6 MMOL/L (ref 3.5–5.1)
Q-T INTERVAL, ECG07: 426 MS
QRS DURATION, ECG06: 108 MS
QTC CALCULATION (BEZET), ECG08: 488 MS
RBC # BLD AUTO: 3.43 M/UL (ref 3.8–5.2)
SODIUM SERPL-SCNC: 138 MMOL/L (ref 136–145)
VENTRICULAR RATE, ECG03: 79 BPM
WBC # BLD AUTO: 7 K/UL (ref 3.6–11)

## 2019-04-11 PROCEDURE — 74011250637 HC RX REV CODE- 250/637: Performed by: FAMILY MEDICINE

## 2019-04-11 PROCEDURE — 74011250636 HC RX REV CODE- 250/636: Performed by: HOSPITALIST

## 2019-04-11 PROCEDURE — 65270000029 HC RM PRIVATE

## 2019-04-11 PROCEDURE — 80048 BASIC METABOLIC PNL TOTAL CA: CPT

## 2019-04-11 PROCEDURE — 72192 CT PELVIS W/O DYE: CPT

## 2019-04-11 PROCEDURE — 74011000258 HC RX REV CODE- 258: Performed by: FAMILY MEDICINE

## 2019-04-11 PROCEDURE — 71045 X-RAY EXAM CHEST 1 VIEW: CPT

## 2019-04-11 PROCEDURE — 74011250636 HC RX REV CODE- 250/636: Performed by: FAMILY MEDICINE

## 2019-04-11 PROCEDURE — 85025 COMPLETE CBC W/AUTO DIFF WBC: CPT

## 2019-04-11 PROCEDURE — 36415 COLL VENOUS BLD VENIPUNCTURE: CPT

## 2019-04-11 RX ORDER — LORAZEPAM 2 MG/ML
0.5 INJECTION INTRAMUSCULAR ONCE
Status: COMPLETED | OUTPATIENT
Start: 2019-04-11 | End: 2019-04-11

## 2019-04-11 RX ADMIN — LEVOTHYROXINE SODIUM 112 MCG: 112 TABLET ORAL at 05:59

## 2019-04-11 RX ADMIN — LORAZEPAM 0.5 MG: 2 INJECTION, SOLUTION INTRAMUSCULAR; INTRAVENOUS at 19:05

## 2019-04-11 RX ADMIN — CEFTRIAXONE 1 G: 1 INJECTION, POWDER, FOR SOLUTION INTRAMUSCULAR; INTRAVENOUS at 19:06

## 2019-04-11 RX ADMIN — LORAZEPAM 0.5 MG: 0.5 TABLET ORAL at 22:19

## 2019-04-11 RX ADMIN — CITALOPRAM HYDROBROMIDE 20 MG: 20 TABLET ORAL at 09:12

## 2019-04-11 RX ADMIN — FLUCONAZOLE 200 MG: 2 INJECTION, SOLUTION INTRAVENOUS at 22:19

## 2019-04-11 NOTE — PROGRESS NOTES
NUTRITION Nutrition screening referral was triggered based on results obtained during nursing admission assessment for \"unsure wt loss. \" The patient's chart was reviewed and nutrition assessment is not indicated at this time. Wt has been stable and spoke with daughter at bedside who reports good appetite at home iwht no issues. Diet changed to puree this morning d/t confusion but eating well during visit and tolerates regular texture foods at home. Spoke with MD and diet adjusted. Wt Readings from Last 10 Encounters:  
04/11/19 66.2 kg (146 lb)  
02/14/19 61 kg (134 lb 8 oz) 10/12/18 63.5 kg (140 lb) 05/22/18 64.1 kg (141 lb 6.4 oz) 05/07/18 64.6 kg (142 lb 6.4 oz) 05/01/18 64 kg (141 lb) 10/18/17 64.4 kg (142 lb)  
09/21/17 64.7 kg (142 lb 9.6 oz) Baseline dementia so kitchen notified to send standard tray unless able to speak with family for meal orders. Plan to see patient for rescreen as indicated. Thank you.   
 
Braden Kramer RD

## 2019-04-11 NOTE — PROGRESS NOTES
Hospitalist Progress Note Amarjit Godinez MD 
Answering service: [phone] OR [phone] from in house phone Date of Service:  2019 NAME:  Dominique Anders :  1/15/1930 MRN:  [id number] Admission Summary:  
Patient presents from home with fall Interval history / Subjective:  
  Pt seen in room She denies any complaints Pleasantly confused Assessment & Plan: 1. Fall with Left Hip Bruising - Xray reviewed. - Check Ct scan as possible irregularity in femoral head. PT of if no s.o fractures. Ct S Spine no Fractures. 2. Dementia  - at baseline, gently redirection 3. Acute on chronic Metabolic Encephalopathy - From Dementia and worsened by UTI - on ABX, follow up cultures 4. Hypothyroidism- c/w Supplements 5. Yeast Cystitis - On Diflucan Code status: DNR 
DVT prophylaxis: SCD Risk of deterioration: medium Total time spent with patient: 30 Minutes Care Plan discussed with: Patient/Family and Nurse Disposition:  PT, OT, RN and in 1-2 days Hospital Problems  Date Reviewed: 2019 Codes Class Noted POA  
 UTI (urinary tract infection) ICD-10-CM: N39.0 ICD-9-CM: 599.0  4/10/2019 Unknown Review of Systems:  
Review of systems not obtained due to patient factors. Vital Signs:  
 Last 24hrs VS reviewed since prior progress note. Most recent are: 
Visit Vitals /63 (BP 1 Location: Right arm, BP Patient Position: At rest;Lying left side) Pulse 64 Temp 96.9 °F (36.1 °C) Resp 18 Ht 5' 4\" (1.626 m) Wt 66.2 kg (146 lb) SpO2 96% BMI 25.06 kg/m² No intake or output data in the 24 hours ending 19 1352 Physical Examination:  
 
 
     
Constitutional:  No acute distress, cooperative, pleasant   
ENT:  Oral mucous moist, oropharynx benign. Neck supple, Resp:  CTA bilaterally. No wheezing/rhonchi/rales. No accessory muscle use CV:  Regular rhythm, normal rate, no murmurs, gallops, rubs GI:  Soft, non distended, non tender. normoactive bowel sounds, no hepatosplenomegaly Musculoskeletal:  No edema, warm, 2+ pulses throughout Neurologic:  Moves all extremities. AAOx3, CN II-XII reviewed Skin:  Good turgor, no rashes or ulcers Data Review:  
 Review and/or order of clinical lab test 
Review and/or order of tests in the radiology section of [facility] Xr Femur Lt 2 V Result Date: 4/10/2019 IMPRESSION: No acute abnormality. Ct Head Wo Cont Result Date: 4/10/2019 IMPRESSION: 1. Chronic appearing white matter changes. Volume loss. No acute intracranial abnormality Ct Spine Cerv Wo Cont Result Date: 4/10/2019 IMPRESSION: 1. No acute abnormality Xr Chest [facility] Result Date: 4/11/2019 IMPRESSION: No Acute Disease. Xr Hip Rt W Or Wo Pelv  1 Vw Result Date: 4/10/2019 IMPRESSION: Subtle irregularity along the lateral cortical margin of the right femoral neck. Given history recommend CT scan to exclude fracture Labs:  
 
Recent Labs 04/11/19 
0610 04/10/19 
1800 WBC 7.0 9.0 HGB 10.8* 12.5 HCT 33.6* 39.0  235 Recent Labs 04/11/19 
0610 04/10/19 
1800  138  
K 3.6 3.9 * 105 CO2 25 27 BUN 15 20 CREA 0.61 0.74 GLU 87 97  
CA 8.4* 9.5 Recent Labs 04/10/19 
1800 SGOT 10* ALT 11* AP 86 TBILI 0.7 TP 6.8 ALB 3.3*  
GLOB 3.5 No results for input(s): INR, PTP, APTT in the last 72 hours. No lab exists for component: INREXT No results for input(s): FE, TIBC, PSAT, FERR in the last 72 hours. No results found for: FOL, RBCF No results for input(s): PH, PCO2, PO2 in the last 72 hours. No results for input(s): CPK, CKNDX, TROIQ in the last 72 hours. No lab exists for component: CPKMB No results found for: CHOL, CHOLX, CHLST, CHOLV, HDL, LDL, LDLC, DLDLP, TGLX, TRIGL, TRIGP, CHHD, CHHDX No results found for: Blossom Bullock Lab Results Component Value Date/Time Color YELLOW/STRAW 04/10/2019 07:19 PM  
 Appearance CLOUDY (A) 04/10/2019 07:19 PM  
 Specific gravity 1.018 04/10/2019 07:19 PM  
 pH (UA) 6.0 04/10/2019 07:19 PM  
 Protein NEGATIVE  04/10/2019 07:19 PM  
 Glucose NEGATIVE  04/10/2019 07:19 PM  
 Ketone 15 (A) 04/10/2019 07:19 PM  
 Bilirubin NEGATIVE  04/10/2019 07:19 PM  
 Urobilinogen 1.0 04/10/2019 07:19 PM  
 Nitrites NEGATIVE  04/10/2019 07:19 PM  
 Leukocyte Esterase LARGE (A) 04/10/2019 07:19 PM  
 Epithelial cells FEW 04/10/2019 07:19 PM  
 Bacteria 4+ (A) 04/10/2019 07:19 PM  
 WBC >100 (H) 04/10/2019 07:19 PM  
 RBC 5-10 04/10/2019 07:19 PM  
 
 
 
Medications Reviewed:  
 
Current Facility-Administered Medications Medication Dose Route Frequency  citalopram (CELEXA) tablet 20 mg  20 mg Oral DAILY  levothyroxine (SYNTHROID) tablet 112 mcg  112 mcg Oral 6am  
 LORazepam (ATIVAN) tablet 0.5 mg  0.5 mg Oral QHS  losartan (COZAAR) tablet 50 mg  50 mg Oral DAILY  sodium chloride (NS) flush 5-40 mL  5-40 mL IntraVENous Q8H  
 sodium chloride (NS) flush 5-40 mL  5-40 mL IntraVENous PRN  
 0.9% sodium chloride infusion  75 mL/hr IntraVENous CONTINUOUS  
 acetaminophen (TYLENOL) tablet 650 mg  650 mg Oral Q4H PRN  
 cefTRIAXone (ROCEPHIN) 1 g in 0.9% sodium chloride (MBP/ADV) 50 mL  1 g IntraVENous Q24H  
 fluconazole (DIFLUCAN) 200mg/100 mL IVPB (premix)  200 mg IntraVENous Q24H  
 
______________________________________________________________________ EXPECTED LENGTH OF STAY: 3d 22h ACTUAL LENGTH OF STAY:          1 
 
            
Jeana Alves MD  
Patient has given Verbal permission to discuss medical care with  
persons present in the room and and also with contact as listed on face sheet.

## 2019-04-11 NOTE — PROGRESS NOTES
Occupational Therapy Screening: 
Services maybe indicated at this time. [facility] triggered for [facility] therapy based on results obtained during the nursing admission assessment. The patients chart was reviewed . Please order a consult for occupational therapy if patient has had a decline in function from baseline and you would like an evaluation to be completed. Thank you.  
 
Yan Gordon OT

## 2019-04-11 NOTE — TELEPHONE ENCOUNTER
Spoke with pt 's daughter Michael Alejandra - states pt became more confused yesterday. Also was very still - would not move or talk. She laid there \"limp. \" She called rescue squad to take to ER. Evaluation seems to be UTI related. Multiple testing done. She was admitted. Physical therapy will be seeing pt today.  Will forward to MD.

## 2019-04-11 NOTE — ED NOTES
Care assumed and bedside SBAR report endorsed on 81yo female with PMH thyroid disease, basal cell carcinoma removal surgery, hysterectomy and cholecystectomy who presents to ED for altered mental status s/p ground level fall, alert and oriented to person and place, pain currently within manageable limits, IV patent, IV antibiotics infusing as ordered, CT scan completed, plan of care reinforced, bed in lowest position, side rails up x2, call bell within reach, will continue to monitor. 9:24 PM 
Report called to Tere Hardin RN using SBAR format to include, MAR, plan of care, procedures and treatments, admission pending transport. 9:44 PM 
Hospitalist at bedside, DNR signed, CT scan and Xray ordered, admission pending completion of studies. 10:12 PM 
Xray and CT scan completed, report called to [facility], admission pending transport.

## 2019-04-11 NOTE — PROGRESS NOTES
PT orders received and acknowledged. Chart reviewed. Attempted to see patient for PT eval.  Patient admitted for AMS with recent GLF. Right hip x-ray shows subtle irregularity along the lateral cortical margin of the rt femoral neck w/ recommendation for CT to exclude fracture. CT pending. Will await CT results before mobilization.

## 2019-04-11 NOTE — H&P
[address] HISTORY AND PHYSICAL Name:  Luz Maria Fu 
MR#:  [id number] :  01/15/1930 ACCOUNT #:  [de-identified] ADMIT DATE:  04/10/2019 CHIEF COMPLAINT:  Altered mental status. HISTORY OF PRESENT ILLNESS:  The patient is an 80-year-old female with past medical history of advanced dementia, basal cell carcinoma, thyroid disease, depression, status post hysterectomy and cholecystectomy, who presents to the ED with the above-mentioned symptom. The patient presents to the hospital with her daughter. The daughter reports that the patient lives two months in [place] with her other child and two months with her. She came to [facility] on 2019 and since then has been more confused than usual.  The daughter reports that the patient has history of dementia which is pretty advanced and for the past few days has been very confused, disoriented. She had a fall today resulting in a small hematoma on the left buttock and was found on the floor. The daughter is not sure whether the patient hit her head or not. The patient was brought to the ER, was found to have an UTI with hematuria and was requested to be observed under the hospitalist service. The daughter reports that previously the patient has had similar changes in her mental status when she has had a UTI. The daughter reports that the patient does not complain of anything else. The daughter reports that the patient has not complained of any headache, blurry vision, sore throat, trouble swallowing, trouble with speech, any chest pain, shortness of breath, cough, fever, chills, abdominal pain, constipation, diarrhea, focal or generalized neurological weakness, hematemesis, melena, hemoptysis, hematuria or any other concerns or problems. PAST MEDICAL HISTORY:  See above. HOME MEDICATIONS:  Currently, the patient is on 1. Lorazepam 0.5 mg nightly. 2.  Celexa 20 mg daily. 3.  Cholecalciferol 1 tablet every day. 4.  Vitamin D. 
5.  Levothyroxine 1 tablet daily before breakfast. 
6.  Losartan 50 mg daily. ALLERGIES:  NO KNOWN DRUG ALLERGIES. SOCIAL HISTORY:  Denies tobacco abuse, alcohol use, IV drug abuse. FAMILY HISTORY:  Discussed, was found mother had history of COPD. REVIEW OF SYMPTOMS:  Cannot be obtained from the patient as she is very confused and has history of dementia. PHYSICAL EXAMINATION: 
VITAL SIGNS:  Temperature 98.1, pulse 72, respiratory rate 18, blood pressure 142/91, pulse ox 99% on room air. GENERAL:  Alert, oriented x0, confused female, appears to be stated age. HEENT:  Pupils equal and reactive to light. Dry mucous membranes. Tympanic membranes clear. NECK:  Supple. CHEST:  Clear to auscultation bilaterally. HEART:  S1 and S2 were heard. ABDOMEN:  Soft, nontender, nondistended. Bowel sounds are physiological. 
EXTREMITIES:  No clubbing, no cyanosis, no edema. NEURO/PSYCH:  Limited exam.  DTRs 1+/4. Rest of the exam could not be performed as the patient is not following instructions. MUSCULOSKELETAL:  Full range of motion in all joints were noted, especially the hip joints have full range of motion actively and passively. No crepitus, no step-offs were noted. SKIN:  Warm. LABORATORY DATA:  White count 9.0, hemoglobin 12.7, hematocrit 39.0, and platelets 750. Urine shows greater than 100 wbc's, 4+ bacteria with yeast present and a large amount of leukocyte esterase. Sodium 138, potassium 3.9, chloride 105, bicarbonate 27, anion gap 6, glucose 97, BUN 20, creatinine 0.74, calcium 9.5, bilirubin total 0.7, ALT 11, AST 10, alkaline phosphatase 86. Urine culture is pending. CT of the head shows chronic-appearing white matter changes, volume loss. No appearance of cranial abnormality. EKG shows normal sinus rhythm with no acute ST elevation. ASSESSMENT AND PLAN: 
1.   Urinary tract infection:  The patient will be admitted on a medical bed.  Start the patient on broad-spectrum IV antibiotics, urine culture, repeat labs in the morning. The patient with hemorrhagic cystitis. Repeat a UA in the morning, monitor H and H. 
2.  Metabolic encephalopathy:  Most likely secondary to urinary tract infection. We will put the patient on fall precautions, treat the underlying cause, gentle IV hydration. CT of the head does not show any acute pathology. Neurovascular checks. If symptoms persist, may consider further intervention and diagnostics. Continue to closely monitor. 3.  History of hypothyroidism:  Continue home medications and continue to monitor. 4.  The patient has some yeast in the urine: We will start the patient on Diflucan. 5.  Gastrointestinal and deep venous thrombosis prophylaxes: The patient will be on sequential compression devices. Ralf Bowling MD 
 
 
[id number] 
D:  04/10/2019 21:27 
T:  04/10/2019 23:03 
JOB #:  [id number]

## 2019-04-11 NOTE — PROGRESS NOTES
Occupational Therapy Acknowledge OT orders and completed chart review. Attempted to see patient for OT eval.  Admitted 4/10 for AMS with recent GLF. Dx with UTI and metabolic encephalopathy. Patient's R hip X ray: Subtle irregularity along the lateral cortical margin of the right femoral neck. Given history recommend CT scan to exclude fracture\"  Awaiting CT scan results prior to mobilization. Will continue to follow for OT services as medically appropriate. Thank you, Neha Loredo OTR/L

## 2019-04-11 NOTE — PROGRESS NOTES
TRANSFER - IN REPORT: 
 
Verbal report received from Rafa(name) on Pavel Ferreira  being received from ED(unit) for routine progression of care Report consisted of patients Situation, Background, Assessment and  
Recommendations(SBAR). Information from the following report(s) SBAR, Kardex, Intake/Output, MAR and Recent Results was reviewed with the receiving nurse. Opportunity for questions and clarification was provided. Assessment completed upon patients arrival to unit and care assumed.

## 2019-04-12 LAB
BACTERIA SPEC CULT: ABNORMAL
BACTERIA UR CULT: ABNORMAL
CC UR VC: ABNORMAL
SERVICE CMNT-IMP: ABNORMAL

## 2019-04-12 PROCEDURE — 74011000258 HC RX REV CODE- 258: Performed by: FAMILY MEDICINE

## 2019-04-12 PROCEDURE — 74011250636 HC RX REV CODE- 250/636: Performed by: HOSPITALIST

## 2019-04-12 PROCEDURE — 97530 THERAPEUTIC ACTIVITIES: CPT

## 2019-04-12 PROCEDURE — 74011250637 HC RX REV CODE- 250/637: Performed by: HOSPITALIST

## 2019-04-12 PROCEDURE — 65270000032 HC RM SEMIPRIVATE

## 2019-04-12 PROCEDURE — 97161 PT EVAL LOW COMPLEX 20 MIN: CPT

## 2019-04-12 PROCEDURE — 97166 OT EVAL MOD COMPLEX 45 MIN: CPT

## 2019-04-12 PROCEDURE — 74011250637 HC RX REV CODE- 250/637: Performed by: FAMILY MEDICINE

## 2019-04-12 PROCEDURE — 74011250636 HC RX REV CODE- 250/636: Performed by: FAMILY MEDICINE

## 2019-04-12 RX ORDER — LORAZEPAM 0.5 MG/1
0.5 TABLET ORAL
Status: DISCONTINUED | OUTPATIENT
Start: 2019-04-12 | End: 2019-04-12

## 2019-04-12 RX ORDER — ENOXAPARIN SODIUM 100 MG/ML
40 INJECTION SUBCUTANEOUS EVERY 24 HOURS
Status: DISCONTINUED | OUTPATIENT
Start: 2019-04-12 | End: 2019-04-16 | Stop reason: HOSPADM

## 2019-04-12 RX ORDER — LORAZEPAM 0.5 MG/1
0.5 TABLET ORAL
Status: DISCONTINUED | OUTPATIENT
Start: 2019-04-12 | End: 2019-04-16 | Stop reason: HOSPADM

## 2019-04-12 RX ORDER — FLUCONAZOLE 100 MG/1
200 TABLET ORAL EVERY EVENING
Status: DISCONTINUED | OUTPATIENT
Start: 2019-04-12 | End: 2019-04-12

## 2019-04-12 RX ADMIN — ENOXAPARIN SODIUM 40 MG: 100 INJECTION SUBCUTANEOUS at 18:12

## 2019-04-12 RX ADMIN — LOSARTAN POTASSIUM 50 MG: 50 TABLET ORAL at 08:32

## 2019-04-12 RX ADMIN — CITALOPRAM HYDROBROMIDE 20 MG: 20 TABLET ORAL at 08:33

## 2019-04-12 RX ADMIN — Medication 10 ML: at 22:04

## 2019-04-12 RX ADMIN — LORAZEPAM 0.5 MG: 0.5 TABLET ORAL at 11:17

## 2019-04-12 RX ADMIN — LORAZEPAM 0.5 MG: 0.5 TABLET ORAL at 21:59

## 2019-04-12 RX ADMIN — CEFTRIAXONE 1 G: 1 INJECTION, POWDER, FOR SOLUTION INTRAMUSCULAR; INTRAVENOUS at 19:29

## 2019-04-12 RX ADMIN — LEVOTHYROXINE SODIUM 112 MCG: 112 TABLET ORAL at 05:16

## 2019-04-12 NOTE — PROGRESS NOTES
Orders received, chart reviewed and patient evaluated by physical therapy. Recommend patient to discharge to home vs rehab pending progress with skilled acute care physical therapy ans what true baseline is and how close pt is to baseline and how much assist family can provide. Miguel Beasley Recommend with nursing patient to utilize bed to chair position. Full evaluation to follow. Kenzie Heller, PT Vitals:  
    04/12/19 1057 BP:    (!) 160/95 BP 1 Location:    Right arm BP Patient Position:      
Pulse:    81 Resp:      
Temp:      
SpO2: on room air    94%

## 2019-04-12 NOTE — PROGRESS NOTES
Problem: Self Care Deficits Care Plan (Adult) Goal: *Acute Goals and Plan of Care (Insert Text) Description Occupational Therapy Goals Initiated 4/12/2019 1. Patient will perform grooming with minimal assistance/contact guard assist within 7 day(s). 2.  Patient will perform upper body dressing with moderate assistance  within 7 day(s). 3.  Patient will perform toilet transfers with supervision/set-up within 7 day(s). Outcome: Progressing Towards Goal 
 OCCUPATIONAL THERAPY EVALUATION Patient: Lukasz Gonzalez (40 y.o. female) Date: 4/12/2019 Primary Diagnosis: UTI (urinary tract infection) [N39.0] Precautions:   Fall, bed alarm ASSESSMENT : 
Based on the objective data described below, patient presents with impaired ability to complete ADL tasks secondary to hx of dementia, poor safety awareness, poor command following, impaired functional balance, and mobility. Admitted s/p unwitnessed fall. Oriented to self only. Per nursing she lives at home with dtr who provides 24 hour care and amb household distance with limited assistance. Mod Ax2 for supine <> sit and sit <> stand with posterior lean and poor self correction. Unable to progress to self care transfers at this time. Poor participation with attempts for grooming at EOB. Returned to supine and issue an activity blanket. Recommend return home with dtr if able to provide needed assistance, if unable then recommend SNF. Will do better in familiar environment secondary to dementia. The following are barriers to ADL independence while in acute care:  
- Cognitive and/or behavioral: orientation, attention to task, command following, processing, insight into deficits, short term memory loss and long term memory loss - Medical condition: strength, sitting balance and standing balance   
- Other:    
 
Patient will benefit from skilled acute intervention to address the above impairments. Patient?s rehabilitation potential is considered to be Guarded Discharge recommendations: Home health (to increase independence and safety) 24 supervision If above is not an option then recommend: Rehab at skilled nursing facility (SNF) (to regain functional baseline patient requires rehab) Barriers to discharging home, in addition to above listed impairments: family availability to assist. 
  
Equipment recommendations for successful discharge (if) home: unknown what DME she has at home. Will be difficult to ed on new DME PLAN : 
Recommendations and Planned Interventions: self care training, functional mobility training, therapeutic activities, patient education, home safety training and family training/education Frequency/Duration: Patient will be followed by occupational therapy 3 times a week to address goals. SUBJECTIVE:  
Patient stated ? Pamela Herrera. ? OBJECTIVE DATA SUMMARY:  
HISTORY:  
Past Medical History:  
Diagnosis Date Basal cell carcinoma   
 face Cataract   
 bilateral  
 Depression Muscle ache Muscle pain Stiff joint Thyroid disease Past Surgical History:  
Procedure Laterality Date HX CATARACT REMOVAL    
 bilateral  
 HX CHOLECYSTECTOMY HX HYSTERECTOMY partial still has ovarias. HX OTHER SURGICAL    
 basal cell removal on face HX OTHER SURGICAL    
 stapedectomy-not sure which ear Prior Level of Function/Environment/Context: Home with dtr. Unknown exact PLOF. Per nursing report from family present yesterday she amb to bathroom at home with minimal assistance. Unclear if DME used. Occupations in which the patient is/was successful, what are the barriers preventing that success:  
Performance Patterns (routines, roles, habits, and rituals):  
Personal Interests and/or values:  
Expanded or extensive additional review of patient history:  
 
Home Situation Home Environment: Private residence Support Systems: Child(yumiko) Hand dominance: Right EXAMINATION OF PERFORMANCE DEFICITS: 
Cognitive/Behavioral Status: 
Neurologic State: Confused Orientation Level: Oriented to person;Disoriented to time;Disoriented to situation;Disoriented to place Cognition: Memory loss;Poor safety awareness Hearing: Auditory Auditory Impairment: None Vision/Perceptual:   
    
    
    
  
    
    
  
Range of Motion: 
AROM: Generally decreased, functional 
  
  
  
  
  
  
  
Strength: 
Strength: Generally decreased, functional 
  
  
  
  
Coordination: 
  
Fine Motor Skills-Upper: (limited intentional movement) Tone & Sensation: 
Sensation: (unable to assess) Balance: 
Sitting: Impaired; Without support Sitting - Static: Fair (occasional) Sitting - Dynamic: Fair (occasional) Standing: Impaired; With support Standing - Static: Constant support;Poor Standing - Dynamic : Poor Functional Mobility and Transfers for ADLs: 
Bed Mobility: 
Supine to Sit: Assist x2; Moderate assistance Sit to Supine: Assist x2; Moderate assistance Transfers: 
Sit to Stand: Assist x2; Moderate assistance Stand to Sit: Assist x2; Moderate assistance ADL Assessment: 
  
Self Feeding: set up and supervision UB dressing: max A 
LB dressing: total A Toileting: total A 
  
  
  
ADL Intervention and task modifications: 
  
Provided verbal and tactile cues for sequencing, body mechanics, safety and balance technqiues. <50% command follow, needing multi modal cues. Sitting EOB with mild posterior lean, CGA-SBA. Mod Ax 2 for bed mob and attempt for sit > stand. Sit > stand with mod AX2 severe posterior lean, with toes off the ground. Returned to bed. Functional Measure: 
Barthel Index: 
Bathin Bladder: 0 Bowels: 0 Groomin Dressin Feedin Mobility: 0 Stairs: 0 Toilet Use: 0 Transfer (Bed to Chair and Back): 5 Total: 10/100 Percentage of impairment  
0% 1-19% 20-39% 40-59% 60-79% 80-99% 100% Barthel Score 0-100 100 99-80 79-60 59-40 20-39 1-19 
 0 The Barthel ADL Index: Guidelines 1. The index should be used as a record of what a patient does, not as a record of what a patient could do. 2. The main aim is to establish degree of independence from any help, physical or verbal, however minor and for whatever reason. 3. The need for supervision renders the patient not independent. 4. A patient's performance should be established using the best available evidence. Asking the patient, friends/relatives and nurses are the usual sources, but direct observation and common sense are also important. However direct testing is not needed. 5. Usually the patient's performance over the preceding 24-48 hours is important, but occasionally longer periods will be relevant. 6. Middle categories imply that the patient supplies over 50 per cent of the effort. 7. Use of aids to be independent is allowed. Akiko Hernadnez., Barthel, D.W. (2975). Functional evaluation: the Barthel Index. [facility] (14)2. IDANIA Deng, [place]., [facility] [place], [address] [place] [address] (1999). Measuring the change indisability after inpatient rehabilitation; comparison of the responsiveness of the Barthel Index and Functional [place] Measure. Journal of Neurology, Neurosurgery, and Psychiatry, 66(4), 640-546. MEHDI Bob.NIKOS, CHARLI Vizcarra, & Kika Collins, MMIRNA. (2004.) Assessment of post-stroke quality of life in cost-effectiveness studies: The usefulness of the Barthel Index and the EuroQoL-5D. [facility], 13, 110-32 Occupational Therapy Evaluation Charge Determination History Examination Decision-Making LOW Complexity : Brief history review  LOW Complexity : 1-3 performance deficits relating to physical, cognitive , or psychosocial skils that result in activity limitations and / or participation restrictions  LOW Complexity : No comorbidities that affect functional and no verbal or physical assistance needed to complete eval tasks Based on the above components, the patient evaluation is determined to be of the following complexity level: LOW Pain: 
Unable to clarify pain. Mild grimacing with achieving erect positioning sitting EOB- holding hips. No other s/s Activity Tolerance:  
Fair Please refer to the flowsheet for vital signs taken during this treatment. After treatment patient left:  
Supine in bed Bed alarm/tab alert on Call light within reach RN notified COMMUNICATION/EDUCATION:  
The patient?s plan of care was discussed with: Physical Therapist, Registered Nurse and . Patient is unable to participate in goal setting and plan of care. This patient?s plan of care is appropriate for delegation to WINNIE. Thank you for this referral. 
Ginette Finch OT Time Calculation: 25 mins

## 2019-04-12 NOTE — PROGRESS NOTES
Problem: Mobility Impaired (Adult and Pediatric) Goal: *Acute Goals and Plan of Care (Insert Text) Description Physical Therapy Goals Initiated 4/12/2019 1. Patient will move from supine to sit and sit to supine , scoot up and down and roll side to side in bed with supervision/set-up within 7 day(s). 2.  Patient will transfer from bed to chair and chair to bed with supervision/set-up using the least restrictive device within 7 day(s). 3.  Patient will perform sit to stand with supervision/set-up within 7 day(s). 4.  Patient will ambulate with supervision/set-up for 100 feet with the least restrictive device within 7 day(s). Stairs goal as needed Outcome: Progressing Towards Goal 
 
 
PHYSICAL THERAPY EVALUATION Patient: Nilda Santiago (70 y.o. female) Date: 4/12/2019 Primary Diagnosis: UTI (urinary tract infection) [N39.0] Precautions: bed alarm,  Fall ASSESSMENT : 
Based on the objective data described below, the patient presents with confusion (oriented X 1 and has dementia at baseline), impaired command following,decreased safety awareness, elevated BP (but she would not keep her arm still for taking of BP so likely not accurate), impaired sitting and standing balance, and the need for mod assist X 2 for sit <>supine and sit <>stand and need for ongoing verbal and tactile cueing for hand placement on walker just for standing in place. In addition note tendency for significant retropulsion in standing as well as great toe extension bilaterally in standing resulting in poor weight shift to the forefoot. Pt deemed unsafe for amb and up to the chair at this time. Disposition depending on progress and how much assist pt can receive at home. I was unable to obtain any history from pt who has dementia at baseline and was very confused during eval, oriented X 1.  Per conversation with OT, she learned from pt's nurse that pt's daughter reported that at baseline pt amb with supervision, pt's daughter cares for pt as well as for another family member. Actual details about pt's home including equipment not known at time of eval or this writing. Pt was admitted with a UTI. Hopefully treatment of the UTI will improve cognitive status. Ideally pt will do better in her own familiar home setting but it is concerning how impaired her mobility is at this time. She is highly at risk for additional falls. Recommend discharge to home if her needs can be met at present level and if not, then short stay at [facility] for rehab. Vitals:  
        04/12/19 1057 BP:       (!) 160/95 BP 1 Location:       Right arm BP Patient Position:          
Pulse:       81 Resp:          
Temp:          
SpO2: on room air       94% Patient will benefit from skilled intervention to address the above impairments. Patients rehabilitation potential is considered to be Good to fair Factors which may influence rehabilitation potential include:  
? None noted ? Mental ability/status ? Medical condition ? Home/family situation and support systems ? Safety awareness 
? Pain tolerance/management 
? Other: PLAN : 
Recommendations and Planned Interventions: 
?           Bed Mobility Training             ? Neuromuscular Re-Education ? Transfer Training                   ? Orthotic/Prosthetic Training 
? Gait Training                         ? Modalities ? Therapeutic Exercises           ? Edema Management/Control ? Therapeutic Activities            ? Patient and Family Training/Education ? Other (comment): Frequency/Duration: Patient will be followed by physical therapy  5 times a week to address goals. Discharge Recommendations: [address] and To Be Determined Further Equipment Recommendations for Discharge: to be determined SUBJECTIVE:  
Patient spoke in incomplete sentences not within context. OBJECTIVE DATA SUMMARY:  
Consult received, chart reviewed, pt cleared by nursing HISTORY:   
Past Medical History:  
Diagnosis Date  Basal cell carcinoma   
 face  Cataract   
 bilateral  
 Depression  Muscle ache  Muscle pain  Stiff joint  Thyroid disease Past Surgical History:  
Procedure Laterality Date  HX CATARACT REMOVAL    
 bilateral  
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  HX OTHER SURGICAL    
 basal cell removal on face  HX OTHER SURGICAL    
 stapedectomy-not sure which ear Prior Level of Function/Home Situation: unable to obtain any history from pt who has dementia at baseline and was very confused during eval, oriented X 1. Per conversation with OT,she learned from pt's nurse that pt's daughter reported that at baseline pt amb with supervision, pt's daughter care for pt as well as for another family member. Personal factors and/or comorbidities impacting plan of care: dementia and now UTI, recent fall. EXAMINATION/PRESENTATION/DECISION MAKING:  
Critical Behavior: 
Neurologic State: Confused Orientation Level: Oriented to person, Disoriented to place, Disoriented to situation, Disoriented to time Cognition: Memory loss, Poor safety awareness Hearing: Auditory Auditory Impairment: None Skin:  refer to MD and nursing notes Edema: refer to MD and nursing notes Range Of Motion: 
AROM: Generally decreased, functional 
  
  
  
  
  
  
  
Strength:   
Strength: Generally decreased, functional 
  
  
  
  
  
  
Tone & Sensation:  
  
  
  
  
  
Sensation: (unable to assess given pt cognition ) Coordination: 
  
Vision:  
  
Functional Mobility: 
Bed Mobility: 
  
Supine to Sit: Assist x2; Moderate assistance Sit to Supine: Assist x2; Moderate assistance Transfers: 
Sit to Stand: Assist x2; Moderate assistance Stand to Sit: Assist x2; Moderate assistance Balance:  
Sitting: Impaired; Without support Sitting - Static: Fair (occasional) Sitting - Dynamic: Fair (occasional) Standing: Impaired; With support Standing - Static: Constant support;Poor Standing - Dynamic : Poor Ambulation/Gait Training: 
  
  
  
  
  
  
  
  
  
  
  
  
  
  
  
  
  
 Pt unable Stairs: Therapeutic Exercises:  
 
 
Functional Measure: 
Timed up and go: 
 
Timed Get Up And Go Test: 0(pt unable) < than 10 seconds=Normal  Greater then 13.5 seconds (in elderly)=Increased fall risk Mary YUEN Predicting the probability for falls in community dwelling older adults using the Timed Up and Go Test. Phys Ther. 2000;80:896-903. Physical Therapy Evaluation Charge Determination History Examination Presentation Decision-Making HIGH Complexity :3+ comorbidities / personal factors will impact the outcome/ POC  LOW Complexity : 1-2 Standardized tests and measures addressing body structure, function, activity limitation and / or participation in recreation  LOW Complexity : Stable, uncomplicated  LOW Complexity : FOTO score of  Based on the above components, the patient evaluation is determined to be of the following complexity level: LOW Pain: 
Pain Scale 1: Numeric (0 - 10) Pain Intensity 1: 0 Activity Tolerance:  
See assessment above Please refer to the flowsheet for vital signs taken during this treatment. After treatment:  
?         Patient left in no apparent distress sitting up in chair ? Patient left in no apparent distress in bed 
? Call bell left within reach ? Nursing notified ? Caregiver present ? Bed alarm activated COMMUNICATION/EDUCATION:  
 The patients plan of care was discussed with: Occupational Therapist, Registered Nurse and . ?         Fall prevention education was provided and the patient/caregiver indicated understanding. ? Patient/family have participated as able in goal setting and plan of care. ?         Patient/family agree to work toward stated goals and plan of care. ?         Patient understands intent and goals of therapy, but is neutral about his/her participation. ? Patient is unable to participate in goal setting and plan of care. Thank you for this referral. 
Amaury Paredes Time Calculation: 25 mins

## 2019-04-12 NOTE — PROGRESS NOTES
Clinical Pharmacy Note: Re: IV to PO Automatic Conversion - Antibiotic Please note: Ana Tracey?s medication(s) (fluconazole has/have been changed from IV to PO based on the following criteria: 
 
The patient: 
1. Has received IV therapy for at least 48 hours 2. Has a functioning GI tract - Taking scheduled oral medications - Tolerating tube feeds at goal rate or a full liquid, soft, or regular diet 3. Is clinically stable - Temperature < 100.4F for at least 24 hours - WBC is trending down This IV to PO conversion is based on the [employer] approved automatic conversion policy for eligible patients. Please call with questions.

## 2019-04-12 NOTE — PROGRESS NOTES
Hospitalist Progress Note Corina Tena MD 
Answering service: [phone] from in house phone Date of Service:  2019 NAME:  Jayden Barone :  1/15/1930 MRN:  [id number] Admission Summary:  
Patient presents from home with fall Interval history / Subjective:  
  Pt seen in room She is pleasently confused being fed by Nurse. She denies any significant pain. Urine shows GNR on Rocephin. Stop Diflucan. Spoke with daughter Assessment & Plan: 1. Fall with Left Hip Bruising - Xray reviewed. - Pelvic Ct does not show any fractures. PT is consulted. Ct S Spine no Fractures. 2. Dementia  - at baseline. 3. Acute on chronic Metabolic Encephalopathy - From Dementia and worsened by UTI - on ABX, follow up cultures 4. Hypothyroidism- c/w Supplements 5. Yeast Cystitis - On Diflucan stop today Code status: DNR 
DVT prophylaxis: SCD Risk of deterioration: medium Total time spent with patient: 30 Minutes Care Plan discussed with: Patient/Family and Nurse Disposition:  PT, OT, RN and in 1-2 days Hospital Problems  Date Reviewed: 2019 Codes Class Noted POA  
 UTI (urinary tract infection) ICD-10-CM: N39.0 ICD-9-CM: 599.0  4/10/2019 Unknown Review of Systems:  
Review of systems not obtained due to patient factors. Vital Signs:  
 Last 24hrs VS reviewed since prior progress note. Most recent are: 
Visit Vitals /72 (BP 1 Location: Right arm, BP Patient Position: At rest) Pulse 65 Temp 97.9 °F (36.6 °C) Resp 18 Ht 5' 4\" (1.626 m) Wt 66.3 kg (146 lb 2.6 oz) SpO2 95% BMI 25.09 kg/m² Intake/Output Summary (Last 24 hours) at 2019 0815 Last data filed at 2019 1720 Gross per 24 hour Intake 50 ml Output  Net 50 ml Physical Examination:  
 
 
     
Constitutional:  No acute distress, cooperative, pleasant   
 ENT:  Oral mucous moist, oropharynx benign. Neck supple, Resp:  CTA bilaterally. No wheezing/rhonchi/rales. No accessory muscle use CV:  Regular rhythm, normal rate, no murmurs, gallops, rubs GI:  Soft, non distended, non tender. normoactive bowel sounds, no hepatosplenomegaly Musculoskeletal:  No edema, warm, 2+ pulses throughout Neurologic:  Moves all extremities. AAOx3, CN II-XII reviewed Skin:  Good turgor, no rashes or ulcers Data Review:  
 Review and/or order of clinical lab test 
Review and/or order of tests in the radiology section of CPT Xr Femur Lt 2 V Result Date: 4/10/2019 IMPRESSION: No acute abnormality. Ct Head Wo Cont Result Date: 4/10/2019 IMPRESSION: 1. Chronic appearing white matter changes. Volume loss. No acute intracranial abnormality Ct Spine Cerv Wo Cont Result Date: 4/10/2019 IMPRESSION: 1. No acute abnormality Ct Pelv Wo Cont Result Date: 4/11/2019 Impression: No acute abnormality. Xr Chest [facility] Result Date: 4/11/2019 IMPRESSION: No Acute Disease. Xr Hip Rt W Or Wo Pelv  1 Vw Result Date: 4/10/2019 IMPRESSION: Subtle irregularity along the lateral cortical margin of the right femoral neck. Given history recommend CT scan to exclude fracture Labs:  
 
Recent Labs 04/11/19 
0610 04/10/19 
1800 WBC 7.0 9.0 HGB 10.8* 12.5 HCT 33.6* 39.0  235 Recent Labs 04/11/19 
0610 04/10/19 
1800  138  
K 3.6 3.9 * 105 CO2 25 27 BUN 15 20 CREA 0.61 0.74 GLU 87 97  
CA 8.4* 9.5 Recent Labs 04/10/19 
1800 SGOT 10* ALT 11* AP 86 TBILI 0.7 TP 6.8 ALB 3.3*  
GLOB 3.5 No results for input(s): INR, PTP, APTT in the last 72 hours. No lab exists for component: INREXT, INREXT No results for input(s): FE, TIBC, PSAT, FERR in the last 72 hours. No results found for: FOL, RBCF No results for input(s): PH, PCO2, PO2 in the last 72 hours. No results for input(s): CPK, CKNDX, TROIQ in the last 72 hours. No lab exists for component: CPKMB No results found for: CHOL, CHOLX, CHLST, CHOLV, HDL, LDL, LDLC, DLDLP, TGLX, TRIGL, TRIGP, CHHD, CHHDX No results found for: Luis Angel Schmid Lab Results Component Value Date/Time Color YELLOW/STRAW 04/10/2019 07:19 PM  
 Appearance CLOUDY (A) 04/10/2019 07:19 PM  
 Specific gravity 1.018 04/10/2019 07:19 PM  
 pH (UA) 6.0 04/10/2019 07:19 PM  
 Protein NEGATIVE  04/10/2019 07:19 PM  
 Glucose NEGATIVE  04/10/2019 07:19 PM  
 Ketone 15 (A) 04/10/2019 07:19 PM  
 Bilirubin NEGATIVE  04/10/2019 07:19 PM  
 Urobilinogen 1.0 04/10/2019 07:19 PM  
 Nitrites NEGATIVE  04/10/2019 07:19 PM  
 Leukocyte Esterase LARGE (A) 04/10/2019 07:19 PM  
 Epithelial cells FEW 04/10/2019 07:19 PM  
 Bacteria 4+ (A) 04/10/2019 07:19 PM  
 WBC >100 (H) 04/10/2019 07:19 PM  
 RBC 5-10 04/10/2019 07:19 PM  
 
 
 
Medications Reviewed:  
 
Current Facility-Administered Medications Medication Dose Route Frequency  citalopram (CELEXA) tablet 20 mg  20 mg Oral DAILY  levothyroxine (SYNTHROID) tablet 112 mcg  112 mcg Oral 6am  
 LORazepam (ATIVAN) tablet 0.5 mg  0.5 mg Oral QHS  losartan (COZAAR) tablet 50 mg  50 mg Oral DAILY  sodium chloride (NS) flush 5-40 mL  5-40 mL IntraVENous Q8H  
 sodium chloride (NS) flush 5-40 mL  5-40 mL IntraVENous PRN  
 0.9% sodium chloride infusion  75 mL/hr IntraVENous CONTINUOUS  
 acetaminophen (TYLENOL) tablet 650 mg  650 mg Oral Q4H PRN  
 cefTRIAXone (ROCEPHIN) 1 g in 0.9% sodium chloride (MBP/ADV) 50 mL  1 g IntraVENous Q24H  
 fluconazole (DIFLUCAN) 200mg/100 mL IVPB (premix)  200 mg IntraVENous Q24H  
 
______________________________________________________________________ EXPECTED LENGTH OF STAY: 3d 22h ACTUAL LENGTH OF STAY:          2 
 
            
Feng Santana MD  
Patient has given Verbal permission to discuss medical care with persons present in the room and and also with contact as listed on face sheet.

## 2019-04-13 LAB
ANION GAP SERPL CALC-SCNC: 5 MMOL/L (ref 5–15)
BUN SERPL-MCNC: 10 MG/DL (ref 6–20)
BUN/CREAT SERPL: 15 (ref 12–20)
CALCIUM SERPL-MCNC: 9.1 MG/DL (ref 8.5–10.1)
CHLORIDE SERPL-SCNC: 104 MMOL/L (ref 97–108)
CO2 SERPL-SCNC: 28 MMOL/L (ref 21–32)
CREAT SERPL-MCNC: 0.66 MG/DL (ref 0.55–1.02)
ERYTHROCYTE [DISTWIDTH] IN BLOOD BY AUTOMATED COUNT: 12 % (ref 11.5–14.5)
GLUCOSE SERPL-MCNC: 120 MG/DL (ref 65–100)
HCT VFR BLD AUTO: 39.4 % (ref 35–47)
HGB BLD-MCNC: 12.8 G/DL (ref 11.5–16)
MCH RBC QN AUTO: 31.3 PG (ref 26–34)
MCHC RBC AUTO-ENTMCNC: 32.5 G/DL (ref 30–36.5)
MCV RBC AUTO: 96.3 FL (ref 80–99)
NRBC # BLD: 0 K/UL (ref 0–0.01)
NRBC BLD-RTO: 0 PER 100 WBC
PLATELET # BLD AUTO: 243 K/UL (ref 150–400)
PMV BLD AUTO: 10.7 FL (ref 8.9–12.9)
POTASSIUM SERPL-SCNC: 3.8 MMOL/L (ref 3.5–5.1)
RBC # BLD AUTO: 4.09 M/UL (ref 3.8–5.2)
SODIUM SERPL-SCNC: 137 MMOL/L (ref 136–145)
WBC # BLD AUTO: 9.9 K/UL (ref 3.6–11)

## 2019-04-13 PROCEDURE — 74011250636 HC RX REV CODE- 250/636: Performed by: HOSPITALIST

## 2019-04-13 PROCEDURE — 74011250636 HC RX REV CODE- 250/636: Performed by: FAMILY MEDICINE

## 2019-04-13 PROCEDURE — 74011250637 HC RX REV CODE- 250/637: Performed by: FAMILY MEDICINE

## 2019-04-13 PROCEDURE — 36415 COLL VENOUS BLD VENIPUNCTURE: CPT

## 2019-04-13 PROCEDURE — 85027 COMPLETE CBC AUTOMATED: CPT

## 2019-04-13 PROCEDURE — 65270000032 HC RM SEMIPRIVATE

## 2019-04-13 PROCEDURE — 74011000258 HC RX REV CODE- 258: Performed by: FAMILY MEDICINE

## 2019-04-13 PROCEDURE — 80048 BASIC METABOLIC PNL TOTAL CA: CPT

## 2019-04-13 RX ADMIN — CEFTRIAXONE 1 G: 1 INJECTION, POWDER, FOR SOLUTION INTRAMUSCULAR; INTRAVENOUS at 18:03

## 2019-04-13 RX ADMIN — CITALOPRAM HYDROBROMIDE 20 MG: 20 TABLET ORAL at 09:40

## 2019-04-13 RX ADMIN — LEVOTHYROXINE SODIUM 112 MCG: 112 TABLET ORAL at 06:01

## 2019-04-13 RX ADMIN — ENOXAPARIN SODIUM 40 MG: 100 INJECTION SUBCUTANEOUS at 18:02

## 2019-04-13 RX ADMIN — Medication 10 ML: at 21:52

## 2019-04-13 RX ADMIN — SODIUM CHLORIDE 25 ML/HR: 900 INJECTION, SOLUTION INTRAVENOUS at 12:58

## 2019-04-13 RX ADMIN — LORAZEPAM 0.5 MG: 0.5 TABLET ORAL at 21:51

## 2019-04-13 RX ADMIN — SODIUM CHLORIDE 25 ML/HR: 900 INJECTION, SOLUTION INTRAVENOUS at 09:50

## 2019-04-13 RX ADMIN — LOSARTAN POTASSIUM 50 MG: 50 TABLET ORAL at 09:40

## 2019-04-13 NOTE — PROGRESS NOTES
Hospitalist Progress Note Vinay Somers MD 
Answering service: [phone] from in house phone Date of Service:  2019 NAME:  Carolyne Gaming :  1/15/1930 MRN:  [id number] Admission Summary:  
Patient presents from home with fall Interval history / Subjective:  
  Pt seen in room She is pleasently confused being fed by Nurse. She denies any significant pain. Urine shows GNR on Rocephin. Stop Diflucan. Spoke with daughter.  She is resting in bed. No acute events per RN. Getting Rocephin . Plan is to let her sit in chair today. Labs ok. : 
Patient is sleeping. Per RN yesterday she was awake around 5 pm. She received one dose of Ativan early this am. Once cleared by PT and more awake can sit up with [place] lift. Patient probably will need placement as daughter can not take care of her in present condition. Haldol will be preferred over ativan if she is agitated at night to prevent sleep during day time. Assessment & Plan: 1. Fall with Left Hip Bruising - Xray reviewed. - Pelvic Ct does not show any fractures. PT is consulted. Ct S Spine no Fractures. 2. Dementia  - at baseline. 3. Acute on chronic Metabolic Encephalopathy - From Dementia and worsened by UTI - on ABX, follow up cultures 4. Hypothyroidism- c/w Supplements 5. Yeast Cystitis - On Diflucan stop today Code status: DNR 
DVT prophylaxis: SCD Risk of deterioration: medium Total time spent with patient: 30 Minutes Care Plan discussed with: Patient/Family and Nurse Disposition:  PT, OT, RN and in 1-2 days Hospital Problems  Date Reviewed: 2019 Codes Class Noted POA  
 UTI (urinary tract infection) ICD-10-CM: N39.0 ICD-9-CM: 599.0  4/10/2019 Unknown Review of Systems:  
Review of systems not obtained due to patient factors. Vital Signs: Last 24hrs VS reviewed since prior progress note. Most recent are: 
Visit Vitals /78 (BP 1 Location: Right arm, BP Patient Position: At rest) Pulse 68 Temp 98.2 °F (36.8 °C) Resp 18 Ht 5' 4\" (1.626 m) Wt 66.4 kg (146 lb 6.2 oz) SpO2 93% BMI 25.13 kg/m² No intake or output data in the 24 hours ending 04/13/19 9860 Physical Examination:  
 
 
     
Constitutional:  No acute distress, cooperative, pleasant   
ENT:  Oral mucous moist, oropharynx benign. Neck supple, Resp:  CTA bilaterally. No wheezing/rhonchi/rales. No accessory muscle use CV:  Regular rhythm, normal rate, no murmurs, gallops, rubs GI:  Soft, non distended, non tender. normoactive bowel sounds, no hepatosplenomegaly Musculoskeletal:  No edema, warm, 2+ pulses throughout Neurologic:  Moves all extremities. AAOx3, CN II-XII reviewed Skin:  Good turgor, no rashes or ulcers Data Review:  
 Review and/or order of clinical lab test 
Review and/or order of tests in the radiology section of CPT Xr Femur Lt 2 V Result Date: 4/10/2019 IMPRESSION: No acute abnormality. Ct Head Wo Cont Result Date: 4/10/2019 IMPRESSION: 1. Chronic appearing white matter changes. Volume loss. No acute intracranial abnormality Ct Spine Cerv Wo Cont Result Date: 4/10/2019 IMPRESSION: 1. No acute abnormality Ct Pelv Wo Cont Result Date: 4/11/2019 Impression: No acute abnormality. Xr Chest [facility] Result Date: 4/11/2019 IMPRESSION: No Acute Disease. Xr Hip Rt W Or Wo Pelv  1 Vw Result Date: 4/10/2019 IMPRESSION: Subtle irregularity along the lateral cortical margin of the right femoral neck. Given history recommend CT scan to exclude fracture Labs:  
 
Recent Labs 04/13/19 
0310 04/11/19 
3562 WBC 9.9 7.0 HGB 12.8 10.8* HCT 39.4 33.6*  201 Recent Labs 04/13/19 
0310 04/11/19 
0610 04/10/19 
1800  138 138  
K 3.8 3.6 3.9  110* 105 CO2 28 25 27 BUN 10 15 20 CREA 0.66 0.61 0.74 * 87 97  
CA 9.1 8.4* 9.5 Recent Labs 04/10/19 
1800 SGOT 10* ALT 11* AP 86 TBILI 0.7 TP 6.8 ALB 3.3*  
GLOB 3.5 No results for input(s): INR, PTP, APTT in the last 72 hours. No lab exists for component: INREXT, INREXT No results for input(s): FE, TIBC, PSAT, FERR in the last 72 hours. No results found for: FOL, RBCF No results for input(s): PH, PCO2, PO2 in the last 72 hours. No results for input(s): CPK, CKNDX, TROIQ in the last 72 hours. No lab exists for component: CPKMB No results found for: CHOL, CHOLX, CHLST, CHOLV, HDL, LDL, LDLC, DLDLP, TGLX, TRIGL, TRIGP, CHHD, CHHDX No results found for: Michael Mendoza Lab Results Component Value Date/Time Color YELLOW/STRAW 04/10/2019 07:19 PM  
 Appearance CLOUDY (A) 04/10/2019 07:19 PM  
 Specific gravity 1.018 04/10/2019 07:19 PM  
 pH (UA) 6.0 04/10/2019 07:19 PM  
 Protein NEGATIVE  04/10/2019 07:19 PM  
 Glucose NEGATIVE  04/10/2019 07:19 PM  
 Ketone 15 (A) 04/10/2019 07:19 PM  
 Bilirubin NEGATIVE  04/10/2019 07:19 PM  
 Urobilinogen 1.0 04/10/2019 07:19 PM  
 Nitrites NEGATIVE  04/10/2019 07:19 PM  
 Leukocyte Esterase LARGE (A) 04/10/2019 07:19 PM  
 Epithelial cells FEW 04/10/2019 07:19 PM  
 Bacteria 4+ (A) 04/10/2019 07:19 PM  
 WBC >100 (H) 04/10/2019 07:19 PM  
 RBC 5-10 04/10/2019 07:19 PM  
 
 
 
Medications Reviewed:  
 
Current Facility-Administered Medications Medication Dose Route Frequency  LORazepam (ATIVAN) tablet 0.5 mg  0.5 mg Oral Q12H PRN  
 enoxaparin (LOVENOX) injection 40 mg  40 mg SubCUTAneous Q24H  citalopram (CELEXA) tablet 20 mg  20 mg Oral DAILY  levothyroxine (SYNTHROID) tablet 112 mcg  112 mcg Oral 6am  
 LORazepam (ATIVAN) tablet 0.5 mg  0.5 mg Oral QHS  losartan (COZAAR) tablet 50 mg  50 mg Oral DAILY  sodium chloride (NS) flush 5-40 mL  5-40 mL IntraVENous Q8H  
  sodium chloride (NS) flush 5-40 mL  5-40 mL IntraVENous PRN  
 0.9% sodium chloride infusion  25 mL/hr IntraVENous CONTINUOUS  
 acetaminophen (TYLENOL) tablet 650 mg  650 mg Oral Q4H PRN  
 cefTRIAXone (ROCEPHIN) 1 g in 0.9% sodium chloride (MBP/ADV) 50 mL  1 g IntraVENous Q24H  
 
______________________________________________________________________ EXPECTED LENGTH OF STAY: 3d 22h ACTUAL LENGTH OF STAY:          3 Aime Chauhan MD  
Patient has given Verbal permission to discuss medical care with  
persons present in the room and and also with contact as listed on face sheet.

## 2019-04-14 PROCEDURE — 74011000258 HC RX REV CODE- 258: Performed by: FAMILY MEDICINE

## 2019-04-14 PROCEDURE — 74011250636 HC RX REV CODE- 250/636: Performed by: FAMILY MEDICINE

## 2019-04-14 PROCEDURE — 65270000032 HC RM SEMIPRIVATE

## 2019-04-14 PROCEDURE — 74011250636 HC RX REV CODE- 250/636: Performed by: HOSPITALIST

## 2019-04-14 PROCEDURE — 74011250637 HC RX REV CODE- 250/637: Performed by: HOSPITALIST

## 2019-04-14 PROCEDURE — 74011250637 HC RX REV CODE- 250/637: Performed by: FAMILY MEDICINE

## 2019-04-14 RX ADMIN — CITALOPRAM HYDROBROMIDE 20 MG: 20 TABLET ORAL at 09:16

## 2019-04-14 RX ADMIN — LORAZEPAM 0.5 MG: 0.5 TABLET ORAL at 21:54

## 2019-04-14 RX ADMIN — LEVOTHYROXINE SODIUM 112 MCG: 112 TABLET ORAL at 05:12

## 2019-04-14 RX ADMIN — ACETAMINOPHEN 650 MG: 325 TABLET ORAL at 09:28

## 2019-04-14 RX ADMIN — Medication 5 ML: at 13:34

## 2019-04-14 RX ADMIN — CEFTRIAXONE 1 G: 1 INJECTION, POWDER, FOR SOLUTION INTRAMUSCULAR; INTRAVENOUS at 18:38

## 2019-04-14 RX ADMIN — ACETAMINOPHEN 650 MG: 325 TABLET ORAL at 05:19

## 2019-04-14 RX ADMIN — LORAZEPAM 0.5 MG: 0.5 TABLET ORAL at 02:56

## 2019-04-14 RX ADMIN — ENOXAPARIN SODIUM 40 MG: 100 INJECTION SUBCUTANEOUS at 18:37

## 2019-04-14 RX ADMIN — Medication 10 ML: at 05:22

## 2019-04-14 RX ADMIN — LOSARTAN POTASSIUM 50 MG: 50 TABLET ORAL at 09:16

## 2019-04-15 LAB
ANION GAP SERPL CALC-SCNC: 6 MMOL/L (ref 5–15)
BUN SERPL-MCNC: 13 MG/DL (ref 6–20)
BUN/CREAT SERPL: 22 (ref 12–20)
CALCIUM SERPL-MCNC: 8.4 MG/DL (ref 8.5–10.1)
CHLORIDE SERPL-SCNC: 108 MMOL/L (ref 97–108)
CO2 SERPL-SCNC: 25 MMOL/L (ref 21–32)
CREAT SERPL-MCNC: 0.58 MG/DL (ref 0.55–1.02)
ERYTHROCYTE [DISTWIDTH] IN BLOOD BY AUTOMATED COUNT: 12.1 % (ref 11.5–14.5)
GLUCOSE SERPL-MCNC: 103 MG/DL (ref 65–100)
HCT VFR BLD AUTO: 35.6 % (ref 35–47)
HGB BLD-MCNC: 12 G/DL (ref 11.5–16)
MCH RBC QN AUTO: 32.2 PG (ref 26–34)
MCHC RBC AUTO-ENTMCNC: 33.7 G/DL (ref 30–36.5)
MCV RBC AUTO: 95.4 FL (ref 80–99)
NRBC # BLD: 0 K/UL (ref 0–0.01)
NRBC BLD-RTO: 0 PER 100 WBC
PLATELET # BLD AUTO: 228 K/UL (ref 150–400)
PMV BLD AUTO: 10.1 FL (ref 8.9–12.9)
POTASSIUM SERPL-SCNC: 3.4 MMOL/L (ref 3.5–5.1)
RBC # BLD AUTO: 3.73 M/UL (ref 3.8–5.2)
SODIUM SERPL-SCNC: 139 MMOL/L (ref 136–145)
WBC # BLD AUTO: 8.5 K/UL (ref 3.6–11)

## 2019-04-15 PROCEDURE — 74011250637 HC RX REV CODE- 250/637: Performed by: FAMILY MEDICINE

## 2019-04-15 PROCEDURE — 65270000032 HC RM SEMIPRIVATE

## 2019-04-15 PROCEDURE — 97535 SELF CARE MNGMENT TRAINING: CPT

## 2019-04-15 PROCEDURE — 85027 COMPLETE CBC AUTOMATED: CPT

## 2019-04-15 PROCEDURE — 80048 BASIC METABOLIC PNL TOTAL CA: CPT

## 2019-04-15 PROCEDURE — 74011250636 HC RX REV CODE- 250/636: Performed by: FAMILY MEDICINE

## 2019-04-15 PROCEDURE — 74011000258 HC RX REV CODE- 258: Performed by: FAMILY MEDICINE

## 2019-04-15 PROCEDURE — 36415 COLL VENOUS BLD VENIPUNCTURE: CPT

## 2019-04-15 PROCEDURE — 74011250636 HC RX REV CODE- 250/636: Performed by: HOSPITALIST

## 2019-04-15 PROCEDURE — 97530 THERAPEUTIC ACTIVITIES: CPT

## 2019-04-15 RX ADMIN — LOSARTAN POTASSIUM 50 MG: 50 TABLET ORAL at 09:29

## 2019-04-15 RX ADMIN — LEVOTHYROXINE SODIUM 112 MCG: 112 TABLET ORAL at 06:40

## 2019-04-15 RX ADMIN — Medication 10 ML: at 13:51

## 2019-04-15 RX ADMIN — CITALOPRAM HYDROBROMIDE 20 MG: 20 TABLET ORAL at 09:29

## 2019-04-15 RX ADMIN — ENOXAPARIN SODIUM 40 MG: 100 INJECTION SUBCUTANEOUS at 20:32

## 2019-04-15 RX ADMIN — ACETAMINOPHEN 650 MG: 325 TABLET ORAL at 15:18

## 2019-04-15 RX ADMIN — CEFTRIAXONE 1 G: 1 INJECTION, POWDER, FOR SOLUTION INTRAMUSCULAR; INTRAVENOUS at 20:32

## 2019-04-15 RX ADMIN — LORAZEPAM 0.5 MG: 0.5 TABLET ORAL at 21:13

## 2019-04-15 NOTE — PROGRESS NOTES
Hospitalist Progress Note Juliocesar Vu MD 
Answering service: [phone] OR [phone] from in house phone Date of Service:  4/15/2019 NAME:  Mita Naidu :  1/15/1930 MRN:  [id number] Admission Summary:  
Patient presents from home with fall Interval history / Subjective:  
Patient with dementia. She feels better this morning compared to admission and ate more  
this morning. No complains. Her two daughters are at bedside, all questions answered. Assessment & Plan: 1. Fall with Left Hip Bruising Xray reviewed. Pelvic Ct does not show any fractures. Ct S Spine no Fractures. PT is following. Plan for discharge to rehab. 2. E coli UTI , pansensitive. Last dose of Ceftriaxone is today. 3. Acute on chronic Metabolic Encephalopathy - From Dementia and worsened by UTI. 4. Dementia  - at baseline. 5. Hypothyroidism- c/w Supplements 6. Yeast Cystitis - On Diflucan stop today Code status: DNR 
DVT prophylaxis: SCD Risk of deterioration: medium Total time spent with patient: 30 Minutes Care Plan discussed with: Patient/Family and Nurse Disposition:  PT, OT, RN and in 1-2 days Hospital Problems  Date Reviewed: 2019 Codes Class Noted POA  
 UTI (urinary tract infection) ICD-10-CM: N39.0 ICD-9-CM: 599.0  4/10/2019 Unknown Review of Systems:  
Review of systems not obtained due to patient factors. Vital Signs:  
 Last 24hrs VS reviewed since prior progress note. Most recent are: 
Visit Vitals /70 (BP 1 Location: Right arm, BP Patient Position: At rest) Pulse 72 Temp 98.4 °F (36.9 °C) Resp 16 Ht 5' 4\" (1.626 m) Wt 67.2 kg (148 lb 3.2 oz) SpO2 94% BMI 25.44 kg/m² No intake or output data in the 24 hours ending 04/15/19 3774 Physical Examination: Constitutional:  No acute distress, cooperative, pleasant. Sitting in chair. ENT:  Oral mucous moist, oropharynx benign. Neck supple, Resp:  CTA bilaterally. No wheezing/rhonchi/rales. No accessory muscle use CV:  Regular rate, no murmurs. S1 S2 present. GI:  Soft, non distended, non tender. normoactive bowel sounds, no hepatosplenomegaly Musculoskeletal:  No edema, warm, 2+ pulses throughout Neurologic:  Moves all extremities. AAOx3, CN II-XII reviewed Skin:  Good turgor, no rashes or ulcers Data Review:  
 Review and/or order of clinical lab test 
Review and/or order of tests in the radiology section of CPT Xr Femur Lt 2 V Result Date: 4/10/2019 IMPRESSION: No acute abnormality. Ct Head Wo Cont Result Date: 4/10/2019 IMPRESSION: 1. Chronic appearing white matter changes. Volume loss. No acute intracranial abnormality Ct Spine Cerv Wo Cont Result Date: 4/10/2019 IMPRESSION: 1. No acute abnormality Ct Pelv Wo Cont Result Date: 4/11/2019 Impression: No acute abnormality. Xr Chest [facility] Result Date: 4/11/2019 IMPRESSION: No Acute Disease. Xr Hip Rt W Or Wo Pelv  1 Vw Result Date: 4/10/2019 IMPRESSION: Subtle irregularity along the lateral cortical margin of the right femoral neck. Given history recommend CT scan to exclude fracture Labs:  
 
Recent Labs 04/15/19 
0423 04/13/19 
0310 WBC 8.5 9.9 HGB 12.0 12.8 HCT 35.6 39.4  243 Recent Labs 04/15/19 
0423 04/13/19 
0310  137  
K 3.4* 3.8  104 CO2 25 28 BUN 13 10 CREA 0.58 0.66 * 120* CA 8.4* 9.1 No results for input(s): SGOT, GPT, ALT, AP, TBIL, TBILI, TP, ALB, GLOB, GGT, AML, LPSE in the last 72 hours. No lab exists for component: AMYP, HLPSE No results for input(s): INR, PTP, APTT in the last 72 hours. No lab exists for component: INREXT, INREXT No results for input(s): FE, TIBC, PSAT, FERR in the last 72 hours. No results found for: FOL, RBCF No results for input(s): PH, PCO2, PO2 in the last 72 hours. No results for input(s): CPK, CKNDX, TROIQ in the last 72 hours. No lab exists for component: CPKMB No results found for: CHOL, CHOLX, CHLST, CHOLV, HDL, LDL, LDLC, DLDLP, TGLX, TRIGL, TRIGP, CHHD, CHHDX No results found for: Caroline Lira Lab Results Component Value Date/Time Color YELLOW/STRAW 04/10/2019 07:19 PM  
 Appearance CLOUDY (A) 04/10/2019 07:19 PM  
 Specific gravity 1.018 04/10/2019 07:19 PM  
 pH (UA) 6.0 04/10/2019 07:19 PM  
 Protein NEGATIVE  04/10/2019 07:19 PM  
 Glucose NEGATIVE  04/10/2019 07:19 PM  
 Ketone 15 (A) 04/10/2019 07:19 PM  
 Bilirubin NEGATIVE  04/10/2019 07:19 PM  
 Urobilinogen 1.0 04/10/2019 07:19 PM  
 Nitrites NEGATIVE  04/10/2019 07:19 PM  
 Leukocyte Esterase LARGE (A) 04/10/2019 07:19 PM  
 Epithelial cells FEW 04/10/2019 07:19 PM  
 Bacteria 4+ (A) 04/10/2019 07:19 PM  
 WBC >100 (H) 04/10/2019 07:19 PM  
 RBC 5-10 04/10/2019 07:19 PM  
 
 
 
Medications Reviewed:  
 
Current Facility-Administered Medications Medication Dose Route Frequency  LORazepam (ATIVAN) tablet 0.5 mg  0.5 mg Oral Q12H PRN  
 enoxaparin (LOVENOX) injection 40 mg  40 mg SubCUTAneous Q24H  citalopram (CELEXA) tablet 20 mg  20 mg Oral DAILY  levothyroxine (SYNTHROID) tablet 112 mcg  112 mcg Oral 6am  
 LORazepam (ATIVAN) tablet 0.5 mg  0.5 mg Oral QHS  losartan (COZAAR) tablet 50 mg  50 mg Oral DAILY  sodium chloride (NS) flush 5-40 mL  5-40 mL IntraVENous Q8H  
 sodium chloride (NS) flush 5-40 mL  5-40 mL IntraVENous PRN  
 0.9% sodium chloride infusion  25 mL/hr IntraVENous CONTINUOUS  
 acetaminophen (TYLENOL) tablet 650 mg  650 mg Oral Q4H PRN  
 cefTRIAXone (ROCEPHIN) 1 g in 0.9% sodium chloride (MBP/ADV) 50 mL  1 g IntraVENous Q24H  
 
______________________________________________________________________ EXPECTED LENGTH OF STAY: 3d 22h ACTUAL LENGTH OF STAY:          5 
 
 Magdalena Mejia MD

## 2019-04-15 NOTE — PROGRESS NOTES
Problem: Falls - Risk of 
Goal: *Absence of Falls Description Document Tia Manus Fall Risk and appropriate interventions in the flowsheet.  
Outcome: Progressing Towards Goal

## 2019-04-15 NOTE — PROGRESS NOTES
Bedside and Verbal shift change report given to Ena Chavez RN (oncoming nurse) by Jeremy Robledo RN (offgoing nurse). Report included the following information SBAR, Kardex, MAR and Recent Results.

## 2019-04-15 NOTE — PROGRESS NOTES
Reason for Admission:   UTI RRAT Score:   18 Do you (patient/family) have any concerns for transition/discharge? Plan for utilizing home health:   No, pt will go to SNF Current Advanced Directive/Advance Care Plan: On file Likelihood of readmission?   low Transition of Care Plan:  Pt lives with her daughters, she spends 2 months with one daughter and then 2 months with the other daughter. Both daughter are very supportive and involved. Therapy is recommending SNF for rehab, CM met with daughters and provided list.  Freedom of choice offered and they prefer [facility]. Cm sent referral through [facility] to [address] [place], [place] [address]ther [facility]. CM will continue to follow. Care Management Interventions PCP Verified by CM: Yes Transition of Care Consult (CM Consult): SNF Partner SNF: Yes Discharge Durable Medical Equipment: No 
Physical Therapy Consult: Yes Occupational Therapy Consult: Yes Speech Therapy Consult: No 
Current Support Network: Relative's Home(Lives with daughter) Confirm Follow Up Transport: Family Plan discussed with Pt/Family/Caregiver: Yes Freedom of Choice Offered: Yes [employer] Information Provided?: No 
Discharge Location Discharge Placement: Skilled nursing facility

## 2019-04-15 NOTE — PROGRESS NOTES
Problem: Self Care Deficits Care Plan (Adult) Goal: *Acute Goals and Plan of Care (Insert Text) Description Occupational Therapy Goals Initiated 4/12/2019 1. Patient will perform grooming with minimal assistance/contact guard assist within 7 day(s). 2.  Patient will perform upper body dressing with moderate assistance  within 7 day(s). 3.  Patient will perform toilet transfers with supervision/set-up within 7 day(s). Outcome: Progressing Towards Goal 
 OCCUPATIONAL THERAPY TREATMENT Patient: Brandon Stanton (14 y.o. female) Date: 4/15/2019 Diagnosis: UTI (urinary tract infection) [N39.0] <principal problem not specified> Precautions: Fall Chart, occupational therapy assessment, plan of care, and goals were reviewed. ASSESSMENT:  
The patient presents with ability to complete self care transfer to [facility] with min A x2 at [facility] with additional time and multiple modal cues. BSC to chair and left up in chair with dtrs present. Dtrs present and supportive. Patient continues to be confused however demos mild increased verbal communication and decrease posterior lean. Command follow limited but able to increase participation with multi modal cues and functional tasks. The following are barriers to ADL independence while in acute care:  
- Cognitive and/or behavioral: orientation, command following, sequencing, safety awareness, insight into deficits, short term memory loss and long term memory loss - Medical condition: functional reach, functional endurance and standing balance   
- Other:    
 
Prior level of function: Lives with dtr. Amb with recent use of RW. Was gardening with supervision prior to admission. PLAN: 
Patient continues to benefit from skilled intervention to address the above impairments. Continue treatment per established plan of care.  
Recommend with staff: Recommend with nursing patient to complete as able in order to maintain strength, endurance and independence: ADLs with supervision/setup, OOB to chair 3x/day and mobilizing to the bathroom for toileting with min A x2 at  with gait belt to [facility]. Thank you for your assistance. Discharge recommendations: Rehab at skilled nursing facility (SNF) (to regain functional baseline patient requires rehab) If above is not an option then recommend: Home health (to increase independence and safety) 24 hour skilled services 24 supervision Barriers to discharging home, in addition to above listed impairments: family availability to assist 
level of physical assist required to maintain patient safety. Equipment recommendations for successful discharge (if) home: hospital bed and mechanical lift, BSC  
 
SUBJECTIVE:  
Patient stated ? Hello. ? OBJECTIVE DATA SUMMARY:  
Cognitive/Behavioral Status: 
Neurologic State: Alert;Confused Orientation Level: Oriented to person Cognition: Follows commands;Decreased attention/concentration;Memory loss Functional Mobility and Transfers for ADLs: 
Bed Mobility: 
Supine to Sit: Moderate assistance Transfers: 
Sit to Stand: Minimum assistance;Assist x2 Functional Transfers Toilet Transfer : Minimum assistance;Assist x2;Adaptive equipment( to [facility]) Bed to Chair: Minimum assistance;Assist x2 Balance: 
Sitting: Impaired Sitting - Static: Good (unsupported) Sitting - Dynamic: Fair (occasional) Standing: Impaired; With support Standing - Static: Fair;Constant support Standing - Dynamic : Fair;Poor ADL Intervention: 
 Provided multi modal cues for self care transfer to [facility] as patient had reported to dtr need to use bathroom. Did not void. Use of RW, pulling up on RW with support, did reach back for [facility] and chair. Fearful with stand > sit needing additional reassurance. Grooming Brushing/Combing Hair: Total assistance (dependent)- initially trying to comb her forehead Lower Body Dressing Assistance Socks: Total assistance (dependent) Toileting Clothing Management: Total assistance (dependent)(tabbed brief) Pain: 
Unable to verbalize. No s/s of pain. Activity Tolerance:  
Fair Please refer to the flowsheet for vital signs taken during this treatment. After treatment patient left:  
Up in chair RN notified Family at bedside COMMUNICATION/COLLABORATION:  
The patient?s plan of care was discussed with: Physical Therapist, Registered Nurse and  Dane Pike OT Time Calculation: 28 mins

## 2019-04-15 NOTE — PROGRESS NOTES
Problem: Mobility Impaired (Adult and Pediatric) Goal: *Acute Goals and Plan of Care (Insert Text) Description Physical Therapy Goals Initiated 4/12/2019 1. Patient will move from supine to sit and sit to supine , scoot up and down and roll side to side in bed with supervision/set-up within 7 day(s). 2.  Patient will transfer from bed to chair and chair to bed with supervision/set-up using the least restrictive device within 7 day(s). 3.  Patient will perform sit to stand with supervision/set-up within 7 day(s). 4.  Patient will ambulate with supervision/set-up for 100 feet with the least restrictive device within 7 day(s). Stairs goal as needed Outcome: Progressing Towards Goal 
PHYSICAL THERAPY TREATMENT Patient: Chelsea Puckett (61 y.o. female) Date: 4/15/2019 Diagnosis: UTI (urinary tract infection) [N39.0] <principal problem not specified> Precautions: Fall Chart, physical therapy assessment, plan of care and goals were reviewed. ASSESSMENT: 
Based on the objective data described below, the patient presents with Minimum assistance and Assist x2 level overall for transfers. Gait training completed at Minimum assistance and Assist x2, 2 feet and using a gait belt and rolling walker. The following are barriers to independence while in acute care:  
-Cognitive and/or behavioral: orientation, attention to task, command following and long term memory loss 
-Medical condition: strength, functional endurance, sitting balance, standing balance and medical history   
-Other:    
 
Prior level of function: ambulatory with a RW, lives with daughter in [place] x [place] [facility] [place] [place] [id number] [place] with her other daughter for 2 months. Pt was in the yard picking weeds with her daughter last week. PLAN: 
Patient continues to benefit from skilled intervention to address the above impairments. Continue treatment per established plan of care. Recommend with staff: OOB to chair for all meals, OOB to BSC with RW and min A x 2 Recommend next PT session: continue gait and transfer training Discharge recommendations: Rehab at skilled nursing facility (SNF) (to regain functional baseline patient requires rehab) Patient's barriers to discharging home, in addition to above impairments: level of physical assist required to maintain patient safety SUBJECTIVE:  
Patient stated ? That is cute. ? 
 Pt remains pleasantly confused, but able to follow simple commands. Hard of hearing, but her \"better ear\" is her left ear. Pt's daughters were present in room to provide a more accurate prior level of function OBJECTIVE DATA SUMMARY:  
Critical Behavior: 
Neurologic State: Alert, Confused Orientation Level: Oriented to person, Disoriented to place, Disoriented to situation, Disoriented to time Cognition: Follows commands, Decreased attention/concentration, Memory loss Functional Mobility Training: 
Bed Mobility: 
  
Supine to Sit: Moderate assistance Transfers: 
Sit to Stand: Minimum assistance;Assist x2 Stand to Sit: Minimum assistance;Assist x2 Bed to Chair: Minimum assistance;Assist x2 Pt transferred from bed-->BSC-->chair with RW and min A x 2. Needs manual assist to advance RW and verbal cueing for stepping. Pt with posterior LOB's during stepping backwards Balance: 
Sitting: Impaired Sitting - Static: Good (unsupported) Sitting - Dynamic: Fair (occasional) Standing: Impaired; With support Standing - Static: Fair;Constant support Standing - Dynamic : Fair;Poor Ambulation/Gait Training: 
Distance (ft): 2 Feet (ft)(2ft x 2) Assistive Device: Gait belt;Walker, rolling Ambulation - Level of Assistance: Minimal assistance;Assist x2(assist to advance RW) Gait Abnormalities: Decreased step clearance; Path deviations; Step to gait;Trunk sway increased Base of Support: Narrowed; Center of gravity altered Speed/Breana: Pace decreased (<100 feet/min) Step Length: Right shortened;Left shortened Activity Tolerance:  
Good Please refer to the flowsheet for vital signs taken during this treatment. After treatment patient left:  
Up in chair Caregiver at bedside Call light within reach RN notified COMMUNICATION/COLLABORATION:  
The patient?s plan of care was discussed with: Occupational Therapist and Registered Nurse Talita More PT, DPT Time Calculation: 29 mins

## 2019-04-16 VITALS
OXYGEN SATURATION: 92 % | RESPIRATION RATE: 18 BRPM | HEART RATE: 68 BPM | BODY MASS INDEX: 25.25 KG/M2 | HEIGHT: 64 IN | WEIGHT: 147.93 LBS | TEMPERATURE: 97.8 F | DIASTOLIC BLOOD PRESSURE: 66 MMHG | SYSTOLIC BLOOD PRESSURE: 142 MMHG

## 2019-04-16 LAB
ANION GAP SERPL CALC-SCNC: 6 MMOL/L (ref 5–15)
BUN SERPL-MCNC: 11 MG/DL (ref 6–20)
BUN/CREAT SERPL: 22 (ref 12–20)
CALCIUM SERPL-MCNC: 8.3 MG/DL (ref 8.5–10.1)
CHLORIDE SERPL-SCNC: 107 MMOL/L (ref 97–108)
CO2 SERPL-SCNC: 26 MMOL/L (ref 21–32)
CREAT SERPL-MCNC: 0.5 MG/DL (ref 0.55–1.02)
GLUCOSE SERPL-MCNC: 97 MG/DL (ref 65–100)
MAGNESIUM SERPL-MCNC: 2 MG/DL (ref 1.6–2.4)
POTASSIUM SERPL-SCNC: 3.2 MMOL/L (ref 3.5–5.1)
SODIUM SERPL-SCNC: 139 MMOL/L (ref 136–145)

## 2019-04-16 PROCEDURE — 36415 COLL VENOUS BLD VENIPUNCTURE: CPT

## 2019-04-16 PROCEDURE — 80048 BASIC METABOLIC PNL TOTAL CA: CPT

## 2019-04-16 PROCEDURE — 83735 ASSAY OF MAGNESIUM: CPT

## 2019-04-16 PROCEDURE — 74011250637 HC RX REV CODE- 250/637: Performed by: INTERNAL MEDICINE

## 2019-04-16 PROCEDURE — 74011250637 HC RX REV CODE- 250/637: Performed by: FAMILY MEDICINE

## 2019-04-16 RX ORDER — POTASSIUM CHLORIDE 750 MG/1
40 TABLET, FILM COATED, EXTENDED RELEASE ORAL
Status: COMPLETED | OUTPATIENT
Start: 2019-04-16 | End: 2019-04-16

## 2019-04-16 RX ADMIN — CITALOPRAM HYDROBROMIDE 20 MG: 20 TABLET ORAL at 10:05

## 2019-04-16 RX ADMIN — POTASSIUM CHLORIDE 40 MEQ: 750 TABLET, EXTENDED RELEASE ORAL at 10:05

## 2019-04-16 NOTE — PROGRESS NOTES
Hospitalist Progress Note James Price MD 
Answering service: [phone] OR [phone] from in house phone Date of Service:  2019 NAME:  Alethea Davis :  1/15/1930 MRN:  [id number] Admission Summary:  
Patient presents from home with fall- hx of dementia Interval history / Subjective:  
Pt seen and examined. Pleasant confused, doesn't know where she is- says her name is Nannette Walsh Assessment & Plan: 1. Fall with Left Hip Bruising Xray reviewed. Pelvic Ct does not show any fractures. Ct S Spine no Fractures. PT is following. Plan for discharge to rehab. 2. E coli UTI , pansensitive. Finished course of ceftriaxone. 3. Acute on chronic Metabolic Encephalopathy - From Dementia and worsened by UTI. 4. Dementia  - at baseline. 5. Hypothyroidism- c/w Supplements 6. Yeast Cystitis - Diflucan course Code status: DNR 
DVT prophylaxis: SCD Care Plan discussed with: Patient/Family and Nurse Disposition: TBD Hospital Problems  Date Reviewed: 2019 Codes Class Noted POA  
 UTI (urinary tract infection) ICD-10-CM: N39.0 ICD-9-CM: 599.0  4/10/2019 Unknown Review of Systems:  
Review of systems not obtained due to patient factors. Vital Signs:  
 Last 24hrs VS reviewed since prior progress note. Most recent are: 
Visit Vitals /69 (BP 1 Location: Left arm, BP Patient Position: At rest) Pulse 69 Temp 98.2 °F (36.8 °C) Resp 16 Ht 5' 4\" (1.626 m) Wt 67.1 kg (147 lb 14.9 oz) SpO2 95% BMI 25.39 kg/m² No intake or output data in the 24 hours ending 19 1615 Physical Examination:  
  
Constitutional:  No acute distress, cooperative, pleasant. Sitting in chair. Resp:  CTA bilaterally. No wheezing/rhonchi/rales. No accessory muscle use GI:  Soft, non distended, non tender. Musculoskeletal:  No edema, warm Neurologic:  Moves all extremities. AAOx3 Data Review:  
 Review and/or order of clinical lab test 
Review and/or order of tests in the radiology section of CPT Xr Femur Lt 2 V Result Date: 4/10/2019 IMPRESSION: No acute abnormality. Ct Head Wo Cont Result Date: 4/10/2019 IMPRESSION: 1. Chronic appearing white matter changes. Volume loss. No acute intracranial abnormality Ct Spine Cerv Wo Cont Result Date: 4/10/2019 IMPRESSION: 1. No acute abnormality Ct Pelv Wo Cont Result Date: 4/11/2019 Impression: No acute abnormality. Xr Chest [facility] Result Date: 4/11/2019 IMPRESSION: No Acute Disease. Xr Hip Rt W Or Wo Pelv  1 Vw Result Date: 4/10/2019 IMPRESSION: Subtle irregularity along the lateral cortical margin of the right femoral neck. Given history recommend CT scan to exclude fracture Labs:  
 
Recent Labs 04/15/19 
0423 WBC 8.5 HGB 12.0 HCT 35.6  Recent Labs 04/16/19 
0502 04/15/19 
0423  139  
K 3.2* 3.4*  
 108 CO2 26 25 BUN 11 13 CREA 0.50* 0.58 GLU 97 103* CA 8.3* 8.4* MG 2.0  -- No results for input(s): SGOT, GPT, ALT, AP, TBIL, TBILI, TP, ALB, GLOB, GGT, AML, LPSE in the last 72 hours. No lab exists for component: AMYP, HLPSE No results for input(s): INR, PTP, APTT in the last 72 hours. No lab exists for component: INREXT, INREXT No results for input(s): FE, TIBC, PSAT, FERR in the last 72 hours. No results found for: FOL, RBCF No results for input(s): PH, PCO2, PO2 in the last 72 hours. No results for input(s): CPK, CKNDX, TROIQ in the last 72 hours. No lab exists for component: CPKMB No results found for: CHOL, CHOLX, CHLST, CHOLV, HDL, LDL, LDLC, DLDLP, TGLX, TRIGL, TRIGP, CHHD, CHHDX No results found for: Lovina Persons Lab Results Component Value Date/Time  Color YELLOW/STRAW 04/10/2019 07:19 PM  
 Appearance CLOUDY (A) 04/10/2019 07:19 PM  
 Specific gravity 1.018 04/10/2019 07:19 PM  
 pH (UA) 6.0 04/10/2019 07:19 PM  
 Protein NEGATIVE  04/10/2019 07:19 PM  
 Glucose NEGATIVE  04/10/2019 07:19 PM  
 Ketone 15 (A) 04/10/2019 07:19 PM  
 Bilirubin NEGATIVE  04/10/2019 07:19 PM  
 Urobilinogen 1.0 04/10/2019 07:19 PM  
 Nitrites NEGATIVE  04/10/2019 07:19 PM  
 Leukocyte Esterase LARGE (A) 04/10/2019 07:19 PM  
 Epithelial cells FEW 04/10/2019 07:19 PM  
 Bacteria 4+ (A) 04/10/2019 07:19 PM  
 WBC >100 (H) 04/10/2019 07:19 PM  
 RBC 5-10 04/10/2019 07:19 PM  
 
 
 
Medications Reviewed:  
 
Current Facility-Administered Medications Medication Dose Route Frequency  potassium chloride SR (KLOR-CON 10) tablet 40 mEq  40 mEq Oral NOW  
 LORazepam (ATIVAN) tablet 0.5 mg  0.5 mg Oral Q12H PRN  
 enoxaparin (LOVENOX) injection 40 mg  40 mg SubCUTAneous Q24H  citalopram (CELEXA) tablet 20 mg  20 mg Oral DAILY  levothyroxine (SYNTHROID) tablet 112 mcg  112 mcg Oral 6am  
 LORazepam (ATIVAN) tablet 0.5 mg  0.5 mg Oral QHS  losartan (COZAAR) tablet 50 mg  50 mg Oral DAILY  sodium chloride (NS) flush 5-40 mL  5-40 mL IntraVENous Q8H  
 sodium chloride (NS) flush 5-40 mL  5-40 mL IntraVENous PRN  
 0.9% sodium chloride infusion  25 mL/hr IntraVENous CONTINUOUS  
 acetaminophen (TYLENOL) tablet 650 mg  650 mg Oral Q4H PRN  
 
______________________________________________________________________ EXPECTED LENGTH OF STAY: 3d 22h ACTUAL LENGTH OF STAY:          6 Da Bejarano MD

## 2019-04-16 NOTE — PROGRESS NOTES
CM reviewed chart and received discharge orders. The [facility] has accepted pt and has a bed available today. Family is in agreement. CM faxed AVS and discharge summary to [facility]. Envelope containing MARs, Kardex, AVS, discharge summary, and DDNR will be sent with pt. CM set up ambulance transport with [facility].  
Dhruv Michaels, REX, ACM

## 2019-04-16 NOTE — PROGRESS NOTES
I have reviewed discharge instructions with the patient's daughters. The daughters verbalized understanding. Report called to [facility] at [phone] [facility]. Transport set up for 2:00 pm, all belongings gathered and taken with patient. No more needs at this time.

## 2019-04-16 NOTE — DISCHARGE INSTRUCTIONS
Discharge Instructions       PATIENT ID: Alison Garcia  MRN: [id number]   YOB: 1930    DATE OF ADMISSION: 4/10/2019  5:34 PM    DATE OF DISCHARGE: 4/16/2019    PRIMARY CARE PROVIDER: Nathaniel Brown MD     ATTENDING PHYSICIAN: Galilea Vee MD  DISCHARGING PROVIDER: Kenneth White MD    To contact this individual call [phone] and ask the  to page. If unavailable ask to be transferred the Adult Hospitalist Department. DISCHARGE DIAGNOSES Fall/ UTI    CONSULTATIONS: IP CONSULT TO HOSPITALIST    PROCEDURES/SURGERIES: * No surgery found *    FOLLOW UP APPOINTMENTS:   Follow-up Information     Follow up With Specialties Details Why Contact Info    Nathaniel Brown MD Internal Medicine In 1 week  [address]  [address] [facility]  [phone]             ADDITIONAL CARE RECOMMENDATIONS: follow up with PCP     DIET: Resume previous diet    DISCHARGE MEDICATIONS:  None new    · It is important that you take the medication exactly as they are prescribed. · Keep your medication in the bottles provided by the pharmacist and keep a list of the medication names, dosages, and times to be taken in your wallet. · Do not take other medications without consulting your doctor. NOTIFY YOUR PHYSICIAN FOR ANY OF THE FOLLOWING:   Fever over 101 degrees for 24 hours. Chest pain, shortness of breath, fever, chills, nausea, vomiting, diarrhea, change in mentation, falling, weakness, bleeding. Severe pain or pain not relieved by medications. Or, any other signs or symptoms that you may have questions about. It was our pleasure to help take care of you and we hope you get well very soon.     DISPOSITION:    Home With:   OT  PT  HH  RN       SNF/Inpatient Rehab/LTAC   x Independent/assisted living    Hospice    Other:     CDMP Checked:   Yes x     PROBLEM LIST Updated:  Yes x     Signed:   Kenneth White MD  4/16/2019  11:37 AM

## 2019-04-16 NOTE — DISCHARGE SUMMARY
Discharge Summary       PATIENT ID: Johan Gray  MRN: [id number]   YOB: 1930    DATE OF ADMISSION: 4/10/2019  5:34 PM    DATE OF DISCHARGE: 4/16/2019   PRIMARY CARE PROVIDER: Carolina Duane, MD     ATTENDING PHYSICIAN: Lashell Magallanes MD  DISCHARGING PROVIDER: Lashell Magallanes MD    To contact this individual call [phone] and ask the  to page. If unavailable ask to be transferred the Adult Hospitalist Department. CONSULTATIONS: IP CONSULT TO HOSPITALIST    PROCEDURES/SURGERIES: * No surgery found *    ADMITTING [address] [place] [address] COURSE:   Admitted following a fall, bruise to her hip without any fractures on xr and ct seen. Also had UTI- treated. Still confused though needs period of rehab on dc    Risk of Re-Admission: high  DISCHARGE DIAGNOSES / PLAN:      1. Fall with Left Hip Bruising: Xray reviewed. Pelvic Ct does not show any fractures. Ct S Spine no Fractures. PT is following. Plan for discharge to rehab. 2. E coli UTI , pansensitive. Finished course of ceftriaxone. 3. Acute on chronic Metabolic Encephalopathy - Dementia and worsened by UTI. 4. Dementia  - at baseline. 5. Yeast Cystitis - treated with Diflucan course     FOLLOW UP APPOINTMENTS:    Follow-up Information     Follow up With Specialties Details Why Contact Info    Carolina Duane, MD Internal Medicine In 1 week  [address] [facility]  [address] [facility]  [phone]             ADDITIONAL CARE RECOMMENDATIONS:  Follow up with PCP     DIET: Resume previous diet    ACTIVITY: Activity as tolerated    DISCHARGE MEDICATIONS:  Current Discharge Medication List      CONTINUE these medications which have NOT CHANGED    Details   LORazepam (ATIVAN) 0.5 mg tablet Take 0.5 mg by mouth nightly.       citalopram (CELEXA) 20 mg tablet TAKE 1 TABLET BY MOUTH DAILY  Qty: 90 Tab, Refills: 0      cholecalciferol, vitamin D3, 2,000 unit tab TAKE ONE TABLET BY MOUTH EVERY DAY  Qty: 90 Tab, Refills: 0 VITAMIN B-12 500 mcg tablet TAKE ONE TABLET BY MOUTH EVERY DAY  Qty: 90 Tab, Refills: 0      levothyroxine (SYNTHROID) 112 mcg tablet TAKE 1 TABLET BY MOUTH DAILY BEFORE BREAKFAST- new dose  Qty: 90 Tab, Refills: 1    Associated Diagnoses: Autoimmune hypothyroidism      losartan (COZAAR) 50 mg tablet TAKE ONE TABLET BY MOUTH EVERY DAY  Qty: 90 Tab, Refills: 0         STOP taking these medications       varicella-zoster recombinant, PF, (SHINGRIX, PF,) 50 mcg/0.5 mL susr injection Comments:   Reason for Stopping:               NOTIFY YOUR PHYSICIAN FOR ANY OF THE FOLLOWING:   Fever over 101 degrees for 24 hours. Chest pain, shortness of breath, fever, chills, nausea, vomiting, diarrhea, change in mentation, falling, weakness, bleeding. Severe pain or pain not relieved by medications. Or, any other signs or symptoms that you may have questions about. DISPOSITION:    Home With:   OT  PT  HH  RN      x Long term SNF/Inpatient Rehab    Independent/assisted living    Hospice    Other:       PATIENT CONDITION AT DISCHARGE:     Functional status    Poor     Deconditioned     Independent      Cognition     Lucid     Forgetful     Dementia      Catheters/lines (plus indication)    Fonseca     PICC     PEG     None      Code status     Full code     DNR      PHYSICAL EXAMINATION AT DISCHARGE:  Patient seen and examined at bedside, Condition stable, explained discharge and follow up plans. General:  Alert, oriented, No acute distress  Resp:  No accessory muscle use, Good AE.   Neuro:  Grossly normal, no focal neuro deficits, follows commands   CHRONIC MEDICAL DIAGNOSES:  Problem List as of 4/16/2019 Date Reviewed: 2/14/2019          Codes Class Noted - Resolved    UTI (urinary tract infection) ICD-10-CM: N39.0  ICD-9-CM: 599.0  4/10/2019 - Present        DNR (do not resuscitate) ICD-10-CM: Z66  ICD-9-CM: V49.86  5/22/2018 - Present        Late onset Alzheimer's disease with behavioral disturbance ICD-10-CM: G30.1, F02.81  ICD-9-CM: 331.0, 294.11  5/1/2018 - Present        Depression ICD-10-CM: F32.9  ICD-9-CM: 357  Unknown - Present        Cataract ICD-10-CM: H26.9  ICD-9-CM: 366.9  Unknown - Present    Overview Signed 9/21/2017  3:38 PM by Doug Fuller     bilateral             Basal cell carcinoma ICD-10-CM: C44.91  ICD-9-CM: 173.91  Unknown - Present    Overview Signed 9/21/2017  3:39 PM by Doug Fuller     face             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  ICD-9-CM: 244.8  9/21/2017 - Present        B12 deficiency ICD-10-CM: E53.8  ICD-9-CM: 266.2  9/21/2017 - Present        Vitamin D deficiency ICD-10-CM: E55.9  ICD-9-CM: 268.9  9/21/2017 - Present        RESOLVED: Thyroid disease ICD-10-CM: E07.9  ICD-9-CM: 246.9  5/1/2018 - 5/1/2018        RESOLVED: Microscopic hematuria ICD-10-CM: R31.29  ICD-9-CM: 599.72  10/18/2017 - 5/1/2018        RESOLVED: Muscle ache ICD-10-CM: M79.10  ICD-9-CM: 729.1  Unknown - 10/18/2017        RESOLVED: Muscle pain ICD-10-CM: M79.10  ICD-9-CM: 729.1  Unknown - 10/18/2017        RESOLVED: Stiff joint ICD-10-CM: M25.60  ICD-9-CM: 719.50  Unknown - 10/18/2017        RESOLVED: Moderate dementia with behavioral disturbance ICD-10-CM: F03.91  ICD-9-CM: 294.21  9/21/2017 - 5/1/2018              37 minutes were spent with the patient on counseling and coordination of care.     Signed:   Jose Manuel Zacarias MD  4/16/2019  11:38 AM

## 2019-04-17 ENCOUNTER — PATIENT OUTREACH (OUTPATIENT)
Dept: INTERNAL MEDICINE CLINIC | Age: 84
End: 2019-04-17

## 2019-04-17 ENCOUNTER — PATIENT OUTREACH (OUTPATIENT)
Dept: CASE MANAGEMENT | Age: 84
End: 2019-04-17

## 2019-04-17 NOTE — PROGRESS NOTES
Community Care Team documentation for patient in [facility] Initial Follow Up Patient was discharged to [facility]. Information included in this progress note has been provided to SNF. Hospital Admission and Diagnosis: PCP : Hilary Talavera MD 
Nurse Navigator in PCP office: Shahida Garza Note routed to Nurse Navigator team. 
 
Spoke with SNF team. Ensured patient arrived to SNF safely with admission packet in order. PT/OT to eval today. Discussed hx of multiple falls. Per chart, patient lives with her daughters, she spends 2 months with one daughter and then 2 months with the other daughter. Both daughter are very supportive and involved. LOS TBD. Community Care Team will follow up weekly with [facility] until discharge. Medications were not reconciled and general patient assessment was not completed during this [facility] outreach.

## 2019-04-17 NOTE — PROGRESS NOTES
Transition of Care Coordination/Hospital to Post Acute Facility: 
  
Date/Time:  2019 10:28 AM 
 
Patient was admitted to [address] [place] [address] on 4/10/19 for treatment of fall and UTI. Patient was discharged 19 to  [facility] for continuation of care. Inpatient RRAT score: 21 Top Challenges reviewed Med Reconciliation Discharge Summary was received by facility Method of communication with care team :phone Nurse Navigator(NN) spoke with Nella Frey, patient's nurse, to provide introduction to self and explanation of the Nurse Navigator Role. Verified name and  as patient identifiers. Discussed and reviewed  anticipated length of stay, anticipated needs at time of discharge, discharge summary, follow up appointments, medication reconciliation ACP:  
Does the patient have a current ACP (including DDNR):  yes Does the post acute facility have a copy of the patients ACP:  yes Medication(s):  
New Medications at Discharge: none Changed Medications at Discharge: none Discontinued Medications at Discharge: shingrix vac. PCP/Specialist follow up: No future appointments. Opportunity to ask questions was provided. Contact information was provided for future reference or further questions. Will continue to monitor.

## 2019-04-24 ENCOUNTER — PATIENT OUTREACH (OUTPATIENT)
Dept: CASE MANAGEMENT | Age: 84
End: 2019-04-24

## 2019-04-24 NOTE — PROGRESS NOTES
Community Care Team Documentation for Patient in [facility] Subsequent Follow up Patient remains at [facility] ([facility]). See previous [facility] Team notes. PCP : Nathaniel Brown MD 
Nurse Navigator in PCP office: Kylah Escobar Spoke with SNF team. PT/OT continue. Currently mod to max assist with transfers. Ambulating 150-200ft with min to mod assist. Max assist with ADLs. PTA pt lived with daughter. [facility] memory care to eval today. LOS pending [facility] eval outcome. Medications were not reconciled and general patient assessment was not completed during this skilled nursing facility outreach.

## 2019-04-25 ENCOUNTER — TELEPHONE (OUTPATIENT)
Dept: INTERNAL MEDICINE CLINIC | Age: 84
End: 2019-04-25

## 2019-04-25 NOTE — TELEPHONE ENCOUNTER
Pt's daughter was advised per MD in regards to transferring pt from rehab to assisted living and getting an order for a sedative for the transfer. He advised her she should request this from the rehab MD. If she can not get any assistance from them to contact one of our nurse navigators for further assistance.  Will forward to MD.

## 2019-04-29 ENCOUNTER — TELEPHONE (OUTPATIENT)
Dept: INTERNAL MEDICINE CLINIC | Age: 84
End: 2019-04-29

## 2019-04-30 ENCOUNTER — TELEPHONE (OUTPATIENT)
Dept: INTERNAL MEDICINE CLINIC | Age: 84
End: 2019-04-30

## 2019-04-30 ENCOUNTER — PATIENT OUTREACH (OUTPATIENT)
Dept: INTERNAL MEDICINE CLINIC | Age: 84
End: 2019-04-30

## 2019-04-30 NOTE — PROGRESS NOTES
Transition of Care Coordination/Hospital to Post Acute Facility/to Assisted Living: 
  
Date/Time:  2019 10:36 AM 
 
Patient was admitted to [facility] on 4/10/19 for treatment of fall and UTI. Patient was discharged 19 to [address] [place] [address], then discharged to [address] [place] [address] on 19 for continuation of care. Inpatient RRAT score: 21 Top Challenges reviewed Request for med reconciliation-nurse will fax all records Request for order to retest Urine. Patient is 10 days post treatment for UTI. Request sent to provider by staff message. Method of communication with care team :phone Nurse Navigator(NN) spoke with nurse, Betty, to provide introduction to self and explanation of the Nurse Navigator Role. Verified name and  as patient identifiers. Discussed and reviewed  discharge summary, follow up appointments, medication reconciliation, recommendations for future lab/imaging ACP:  
Does the patient have a current ACP (including DDNR):  yes Does the post acute facility have a copy of the patients ACP:  yes Medication(s):  
Info from Hospital to [facility]. Willy Hugo has not faxed records at this time. New Medications at Discharge: none Changed Medications at Discharge: none Discontinued Medications at Discharge: shingrix vac 
  
PCP/Specialist follow up:  
Future Appointments Date Time Provider Daniel Navarro 5/15/2019  1:20 PM [place] [id number] [facility] Opportunity to ask questions was provided. Contact information was provided for future reference or further questions. Will continue to monitor. Spoke with daughter, Rigoberto Sanchez. Patient ID verified name and . Patient was transferred to memory care unit at [address] [place] [address]. Daughter was advised not to take patient out of facility this week to allow for period of adjustment and reduce confusion.  FU appointment offered next week with PCP. Daughter unable to make it at time offered. Next acceptable appointment time 5/15/19 at 1:20 accepted. Call placed to [address] [place] [address] Dr. Nurse Hernández. Nurse will fax discharge summary from [facility] David Rodriguez. Fax # provided. Nurse requests retest of UA order. Request forwarded to provider for approval.  
 
Call returned to nurse Hernández at [address] [place] [address] . Per Dr Rodriguez Scot staff message, order supplied for repeat UA and culture left on secure VM.  
 
4/30/19     2:57pm   Call to [facility] at [address] [place] [address]  for verification of receipt of repeat UA/culture order left on VM earlier and check on records to be faxed. Nurse Hernández will fax records this afternoon and will get repeat UA/culture on patient.    ST

## 2019-05-01 ENCOUNTER — PATIENT OUTREACH (OUTPATIENT)
Dept: CASE MANAGEMENT | Age: 84
End: 2019-05-01

## 2019-05-01 NOTE — PROGRESS NOTES
Community Care Team Documentation for Patient in [facility] Discharge Note Patient has been discharged from [address] [facility] 
 ([facility]). See previous [facility] Team notes. PCP : Jonh Rodriguez MD 
Nurse Navigator in PCP office: Estephania Her Note routed to Nurse Navigator team. 
 
Spoke with SNF team.   Pt discharged to [facility] memory care 4/29 with [facility]. CCT not aware of planned discharge date, PCP follow up not scheduled closer to SNF DC. PCP FU 5/15 at 1:20PM. CCT to notify PCP NN. Community Care Team will sign off at this time. Medications were not reconciled and general patient assessment was not completed during this skilled nursing facility outreach.

## 2019-05-01 NOTE — Clinical Note
t discharged to [facility] memory care 4/29 with At [address] CCT not aware of planned discharge date, PCP follow up not scheduled closer to SNF DC.  PCP FU 5/15 at 1:20PM.

## 2019-05-01 NOTE — TELEPHONE ENCOUNTER
Per Dr Nelly Rob staff message, Dr Marcia Castro will follow for PCP care and an order was given to nurse Ellis at [address] [place] [address] for UA and culture.

## 2019-05-06 ENCOUNTER — PATIENT OUTREACH (OUTPATIENT)
Dept: INTERNAL MEDICINE CLINIC | Age: 84
End: 2019-05-06

## 2019-05-06 NOTE — PROGRESS NOTES
Fax sent requesting discharge summary from [id number] [facility]. Confirmation of fax received. [phone]

## 2019-05-07 ENCOUNTER — PATIENT OUTREACH (OUTPATIENT)
Dept: INTERNAL MEDICINE CLINIC | Age: 84
End: 2019-05-07

## 2019-05-07 DIAGNOSIS — N39.0 URINARY TRACT INFECTION WITH HEMATURIA, SITE UNSPECIFIED: Primary | ICD-10-CM

## 2019-05-07 DIAGNOSIS — R31.9 URINARY TRACT INFECTION WITH HEMATURIA, SITE UNSPECIFIED: Primary | ICD-10-CM

## 2019-05-07 NOTE — PROGRESS NOTES
Verbal order received UA and Urine culture per Dr. Tammie Rubi. Orders printed and faxed to nurse, Alex Hanna, at [facility] as requested. Confirmation of fax received.

## 2019-05-15 ENCOUNTER — OFFICE VISIT (OUTPATIENT)
Dept: INTERNAL MEDICINE CLINIC | Age: 84
End: 2019-05-15

## 2019-05-15 VITALS
RESPIRATION RATE: 18 BRPM | TEMPERATURE: 98.5 F | HEART RATE: 61 BPM | SYSTOLIC BLOOD PRESSURE: 122 MMHG | WEIGHT: 130.5 LBS | OXYGEN SATURATION: 97 % | HEIGHT: 64 IN | DIASTOLIC BLOOD PRESSURE: 72 MMHG | BODY MASS INDEX: 22.28 KG/M2

## 2019-05-15 DIAGNOSIS — F32.9 REACTIVE DEPRESSION: ICD-10-CM

## 2019-05-15 DIAGNOSIS — F02.818 LATE ONSET ALZHEIMER'S DISEASE WITH BEHAVIORAL DISTURBANCE (HCC): ICD-10-CM

## 2019-05-15 DIAGNOSIS — G30.1 LATE ONSET ALZHEIMER'S DISEASE WITH BEHAVIORAL DISTURBANCE (HCC): ICD-10-CM

## 2019-05-15 DIAGNOSIS — R31.9 URINARY TRACT INFECTION WITH HEMATURIA, SITE UNSPECIFIED: Primary | ICD-10-CM

## 2019-05-15 DIAGNOSIS — E06.3 AUTOIMMUNE HYPOTHYROIDISM: ICD-10-CM

## 2019-05-15 DIAGNOSIS — N39.0 URINARY TRACT INFECTION WITH HEMATURIA, SITE UNSPECIFIED: Primary | ICD-10-CM

## 2019-05-15 RX ORDER — ACETAMINOPHEN 325 MG/1
TABLET ORAL
Refills: 0 | COMMUNITY
Start: 2019-04-29 | End: 2019-05-21

## 2019-05-15 RX ORDER — GRANULES FOR ORAL 3 G/1
3 POWDER ORAL
Qty: 4 PACKET | Refills: 12 | Status: SHIPPED | OUTPATIENT
Start: 2019-05-15 | End: 2019-06-19

## 2019-05-15 NOTE — PROGRESS NOTES
HISTORY OF PRESENT ILLNESS  Cabrera Webster is a 80 y.o. female. HPI Subjective:  Chato Hutton is seen today for hospital and SNF rehab stay follow up. This is not a transitional care visit due to timing exceeding two weeks. She is accompanied by her daughter, who provides all the history given her dementia. 1. UTI. She has had recurrent UTIs, but developed sepsis, which led to her admission. There is also a question of some neurologic changes progressing after sepsis. Her dementia seems a bit worse. She has had a recurrent UTI at her current assisted living facility. She also has had a fall without any significant injury. 2. Dementia. See above. Some progression. 3. Hypothyroidism. Will plan on recheck in three months. Social  History:  Notable for her adjusting fairly well to the assisted living setting. This was surprising to her daughter, Saray Holbrook, but things do seem to be going fairly smoothly. Review of Systems   Unable to perform ROS: Dementia   Constitutional: Negative for chills and fever. Psychiatric/Behavioral: Positive for memory loss. Physical Exam   Constitutional: No distress. Cardiovascular: Normal rate and regular rhythm. Exam reveals no gallop and no friction rub. No murmur heard. Pulmonary/Chest: Effort normal and breath sounds normal.   Musculoskeletal: She exhibits no edema. Psychiatric:   Calm, good spirits   Nursing note and vitals reviewed. ASSESSMENT and PLAN  Diagnoses and all orders for this visit:    1. Urinary tract infection with hematuria, site unspecified  -     fosfomycin (MONUROL) 3 gram pack oral packet; Take 1 Packet by mouth every seven (7) days. Start after next urine collection    2. Reactive depression- Continue current regimen of prescription and / or OTC medications     3. Autoimmune hypothyroidism- Continue current regimen of prescription and / or OTC medications     4.  Late onset Alzheimer's disease with behavioral disturbance- Continue current regimen of prescription and / or OTC medications     Fully reviewed with daughter

## 2019-05-21 ENCOUNTER — APPOINTMENT (OUTPATIENT)
Dept: GENERAL RADIOLOGY | Age: 84
End: 2019-05-21
Attending: EMERGENCY MEDICINE
Payer: MEDICARE

## 2019-05-21 ENCOUNTER — APPOINTMENT (OUTPATIENT)
Dept: CT IMAGING | Age: 84
End: 2019-05-21
Attending: EMERGENCY MEDICINE
Payer: MEDICARE

## 2019-05-21 ENCOUNTER — HOSPITAL ENCOUNTER (EMERGENCY)
Age: 84
Discharge: HOME | End: 2019-05-21
Attending: EMERGENCY MEDICINE
Payer: MEDICARE

## 2019-05-21 VITALS
HEART RATE: 96 BPM | OXYGEN SATURATION: 100 % | TEMPERATURE: 98.9 F | RESPIRATION RATE: 18 BRPM | SYSTOLIC BLOOD PRESSURE: 124 MMHG | DIASTOLIC BLOOD PRESSURE: 70 MMHG
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
## 2019-05-21 DIAGNOSIS — S32.10XA CLOSED FRACTURE OF SACRUM, UNSPECIFIED PORTION OF SACRUM, INITIAL ENCOUNTER (HCC): ICD-10-CM

## 2019-05-21 DIAGNOSIS — S22.020A: ICD-10-CM

## 2019-05-21 DIAGNOSIS — S30.0XXA CONTUSION OF BUTTOCK, INITIAL ENCOUNTER: ICD-10-CM

## 2019-05-21 DIAGNOSIS — Q76.49 DEFECT OF ENDPLATE OF VERTEBRA: ICD-10-CM

## 2019-05-21 DIAGNOSIS — S32.009A CLOSED FRACTURE OF TRANSVERSE PROCESS OF LUMBAR VERTEBRA, INITIAL ENCOUNTER (HCC): ICD-10-CM

## 2019-05-21 DIAGNOSIS — M48.061 SPINAL STENOSIS OF LUMBAR REGION WITHOUT NEUROGENIC CLAUDICATION: ICD-10-CM

## 2019-05-21 LAB
ALBUMIN SERPL-MCNC: 3.7 G/DL (ref 3.5–5)
ALBUMIN/GLOB SERPL: 1.1 {RATIO} (ref 1.1–2.2)
ALP SERPL-CCNC: 82 U/L (ref 45–117)
ALT SERPL-CCNC: 22 U/L (ref 12–78)
ANION GAP SERPL CALC-SCNC: 9 MMOL/L (ref 5–15)
APPEARANCE UR: CLEAR
AST SERPL-CCNC: 16 U/L (ref 15–37)
ATRIAL RATE: 64 BPM
BACTERIA URNS QL MICRO: NEGATIVE /HPF
BASOPHILS # BLD: 0 K/UL (ref 0–0.1)
BASOPHILS NFR BLD: 0 % (ref 0–1)
BILIRUB SERPL-MCNC: 0.8 MG/DL (ref 0.2–1)
BILIRUB UR QL: NEGATIVE
BUN SERPL-MCNC: 22 MG/DL (ref 6–20)
BUN/CREAT SERPL: 26 (ref 12–20)
CALCIUM SERPL-MCNC: 9.5 MG/DL (ref 8.5–10.1)
CALCULATED P AXIS, ECG09: 98 DEGREES
CALCULATED R AXIS, ECG10: 12 DEGREES
CALCULATED T AXIS, ECG11: 12 DEGREES
CHLORIDE SERPL-SCNC: 106 MMOL/L (ref 97–108)
CK MB CFR SERPL CALC: 3 % (ref 0–2.5)
CK MB SERPL-MCNC: 2.5 NG/ML (ref 5–25)
CK SERPL-CCNC: 82 U/L (ref 26–192)
CO2 SERPL-SCNC: 24 MMOL/L (ref 21–32)
COLOR UR: ABNORMAL
COMMENT, HOLDF: NORMAL
CREAT SERPL-MCNC: 0.85 MG/DL (ref 0.55–1.02)
DIAGNOSIS, 93000: NORMAL
DIFFERENTIAL METHOD BLD: ABNORMAL
EOSINOPHIL # BLD: 0.1 K/UL (ref 0–0.4)
EOSINOPHIL NFR BLD: 1 % (ref 0–7)
EPITH CASTS URNS QL MICRO: ABNORMAL /LPF
ERYTHROCYTE [DISTWIDTH] IN BLOOD BY AUTOMATED COUNT: 13.2 % (ref 11.5–14.5)
GLOBULIN SER CALC-MCNC: 3.5 G/DL (ref 2–4)
GLUCOSE SERPL-MCNC: 88 MG/DL (ref 65–100)
GLUCOSE UR STRIP.AUTO-MCNC: NEGATIVE MG/DL
HCT VFR BLD AUTO: 38.6 % (ref 35–47)
HGB BLD-MCNC: 12.5 G/DL (ref 11.5–16)
HGB UR QL STRIP: ABNORMAL
IMM GRANULOCYTES # BLD AUTO: 0.1 K/UL (ref 0–0.04)
IMM GRANULOCYTES NFR BLD AUTO: 1 % (ref 0–0.5)
KETONES UR QL STRIP.AUTO: 15 MG/DL
LEUKOCYTE ESTERASE UR QL STRIP.AUTO: ABNORMAL
LYMPHOCYTES # BLD: 2.7 K/UL (ref 0.8–3.5)
LYMPHOCYTES NFR BLD: 29 % (ref 12–49)
MCH RBC QN AUTO: 31.1 PG (ref 26–34)
MCHC RBC AUTO-ENTMCNC: 32.4 G/DL (ref 30–36.5)
MCV RBC AUTO: 96 FL (ref 80–99)
MONOCYTES # BLD: 0.6 K/UL (ref 0–1)
MONOCYTES NFR BLD: 7 % (ref 5–13)
NEUTS SEG # BLD: 5.9 K/UL (ref 1.8–8)
NEUTS SEG NFR BLD: 62 % (ref 32–75)
NITRITE UR QL STRIP.AUTO: NEGATIVE
NRBC # BLD: 0 K/UL (ref 0–0.01)
NRBC BLD-RTO: 0 PER 100 WBC
P-R INTERVAL, ECG05: 134 MS
PH UR STRIP: 7.5 [PH] (ref 5–8)
PLATELET # BLD AUTO: 243 K/UL (ref 150–400)
PMV BLD AUTO: 10.1 FL (ref 8.9–12.9)
POTASSIUM SERPL-SCNC: 3.7 MMOL/L (ref 3.5–5.1)
PROT SERPL-MCNC: 7.2 G/DL (ref 6.4–8.2)
PROT UR STRIP-MCNC: NEGATIVE MG/DL
Q-T INTERVAL, ECG07: 468 MS
QRS DURATION, ECG06: 110 MS
QTC CALCULATION (BEZET), ECG08: 482 MS
RBC # BLD AUTO: 4.02 M/UL (ref 3.8–5.2)
RBC #/AREA URNS HPF: ABNORMAL /HPF (ref 0–5)
SAMPLES BEING HELD,HOLD: NORMAL
SODIUM SERPL-SCNC: 139 MMOL/L (ref 136–145)
SP GR UR REFRACTOMETRY: 1.02 (ref 1–1.03)
TROPONIN I SERPL-MCNC: <0.05 NG/ML
UR CULT HOLD, URHOLD: NORMAL
UROBILINOGEN UR QL STRIP.AUTO: 1 EU/DL (ref 0.2–1)
VENTRICULAR RATE, ECG03: 64 BPM
WBC # BLD AUTO: 9.4 K/UL (ref 3.6–11)
WBC URNS QL MICRO: ABNORMAL /HPF (ref 0–4)

## 2019-05-21 PROCEDURE — 82553 CREATINE MB FRACTION: CPT

## 2019-05-21 PROCEDURE — 96376 TX/PRO/DX INJ SAME DRUG ADON: CPT

## 2019-05-21 PROCEDURE — 85025 COMPLETE CBC W/AUTO DIFF WBC: CPT

## 2019-05-21 PROCEDURE — 72131 CT LUMBAR SPINE W/O DYE: CPT

## 2019-05-21 PROCEDURE — 73521 X-RAY EXAM HIPS BI 2 VIEWS: CPT

## 2019-05-21 PROCEDURE — 97116 GAIT TRAINING THERAPY: CPT

## 2019-05-21 PROCEDURE — 87086 URINE CULTURE/COLONY COUNT: CPT

## 2019-05-21 PROCEDURE — 84484 ASSAY OF TROPONIN QUANT: CPT

## 2019-05-21 PROCEDURE — 74011250637 HC RX REV CODE- 250/637: Performed by: EMERGENCY MEDICINE

## 2019-05-21 PROCEDURE — 74011250636 HC RX REV CODE- 250/636: Performed by: EMERGENCY MEDICINE

## 2019-05-21 PROCEDURE — 72192 CT PELVIS W/O DYE: CPT

## 2019-05-21 PROCEDURE — 99285 EMERGENCY DEPT VISIT HI MDM: CPT

## 2019-05-21 PROCEDURE — 97161 PT EVAL LOW COMPLEX 20 MIN: CPT

## 2019-05-21 PROCEDURE — 82550 ASSAY OF CK (CPK): CPT

## 2019-05-21 PROCEDURE — 71045 X-RAY EXAM CHEST 1 VIEW: CPT

## 2019-05-21 PROCEDURE — 81001 URINALYSIS AUTO W/SCOPE: CPT

## 2019-05-21 PROCEDURE — 72125 CT NECK SPINE W/O DYE: CPT

## 2019-05-21 PROCEDURE — 72128 CT CHEST SPINE W/O DYE: CPT

## 2019-05-21 PROCEDURE — 70450 CT HEAD/BRAIN W/O DYE: CPT

## 2019-05-21 PROCEDURE — 96374 THER/PROPH/DIAG INJ IV PUSH: CPT

## 2019-05-21 PROCEDURE — 96361 HYDRATE IV INFUSION ADD-ON: CPT

## 2019-05-21 PROCEDURE — 36415 COLL VENOUS BLD VENIPUNCTURE: CPT

## 2019-05-21 PROCEDURE — 80053 COMPREHEN METABOLIC PANEL: CPT

## 2019-05-21 PROCEDURE — 93005 ELECTROCARDIOGRAM TRACING: CPT

## 2019-05-21 RX ORDER — ACETAMINOPHEN 325 MG/1
650 TABLET ORAL
Qty: 50 TAB | Refills: 0 | Status: SHIPPED
Start: 2019-05-21 | End: 2019-10-29 | Stop reason: SDUPTHER

## 2019-05-21 RX ORDER — SODIUM CHLORIDE 9 MG/ML
75 INJECTION, SOLUTION INTRAVENOUS CONTINUOUS
Status: DISCONTINUED | OUTPATIENT
Start: 2019-05-21 | End: 2019-05-21 | Stop reason: HOSPADM

## 2019-05-21 RX ORDER — MORPHINE SULFATE 2 MG/ML
2 INJECTION, SOLUTION INTRAMUSCULAR; INTRAVENOUS
Status: COMPLETED | OUTPATIENT
Start: 2019-05-21 | End: 2019-05-21

## 2019-05-21 RX ORDER — ACETAMINOPHEN 325 MG/1
650 TABLET ORAL
Status: COMPLETED | OUTPATIENT
Start: 2019-05-21 | End: 2019-05-21

## 2019-05-21 RX ADMIN — SODIUM CHLORIDE 75 ML/HR: 900 INJECTION, SOLUTION INTRAVENOUS at 13:09

## 2019-05-21 RX ADMIN — MORPHINE SULFATE 2 MG: 2 INJECTION, SOLUTION INTRAMUSCULAR; INTRAVENOUS at 09:30

## 2019-05-21 RX ADMIN — ACETAMINOPHEN 650 MG: 325 TABLET ORAL at 16:57

## 2019-05-21 RX ADMIN — MORPHINE SULFATE 2 MG: 2 INJECTION, SOLUTION INTRAMUSCULAR; INTRAVENOUS at 10:33

## 2019-05-21 NOTE — ED TRIAGE NOTES
Pt arrives after pt was found in front of her chair at nursing facility by her daughter. Pt has advanced dementia and is very poor historian. Unsure of what happened prior to pt falling. Pt appears in discomfort and reports pain to lower back.

## 2019-05-21 NOTE — ED PROVIDER NOTES
80 y.o. female with past medical history significant for depression, dementia, thyroid dz, cataracts, bcc, who presents from ems with chief complaint of glf. Pt experienced an unwitnessed glf this earlier this morning, from presumably her chair onto the floor. Nursing home staff found her sitting on the floor in front of her chair with her arm still on the armrest at 7:00 AM. EMS was called and measured bgl 90 en route. S/p arrival to ED, pt's daughter states that she's \"more disoriented than normal\"; baseline alert, but not oriented. Upon examination, she winces at ranging of her BLE and mechanical movement of her back. There are no other acute medical concerns at this time. PCP: Aracelis Rivera MD    Full history, physical exam, and ROS unable to be obtained due to: advanced dementia. Note written by Farnaz Roach, as dictated by Yani Shetty MD 9:07 AM    The history is provided by the patient, a relative, the EMS personnel and the nursing home. History limited by: dementia. No  was used. 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     HX OTHER SURGICAL      basal cell removal on face    HX OTHER SURGICAL      stapedectomy-not sure which ear         Family History:   Problem Relation Age of Onset    COPD Mother        Social History     Socioeconomic History    Marital status:      Spouse name: Not on file    Number of children: Not on file    Years of education: Not on file    Highest education level: Not on file   Occupational History    Not on file   Social Needs    Financial resource strain: Not on file    Food insecurity: Worry: Not on file     Inability: Not on file    Transportation needs:     Medical: Not on file     Non-medical: Not on file   Tobacco Use    Smoking status: Never Smoker    Smokeless tobacco: Never Used   Substance and Sexual Activity    Alcohol use: No    Drug use: Not on file    Sexual activity: Never   Lifestyle    Physical activity:     Days per week: Not on file     Minutes per session: Not on file    Stress: Not on file   Relationships    Social connections:     Talks on phone: Not on file     Gets together: Not on file     Attends Restorationism service: Not on file     Active member of club or organization: Not on file     Attends meetings of clubs or organizations: Not on file     Relationship status: Not on file    Intimate partner violence:     Fear of current or ex partner: Not on file     Emotionally abused: Not on file     Physically abused: Not on file     Forced sexual activity: Not on file   Other Topics Concern    Not on file   Social History Narrative    Not on file         ALLERGIES: Patient has no known allergies. Review of Systems   Unable to perform ROS: Dementia   Musculoskeletal: Positive for arthralgias, back pain and myalgias. Psychiatric/Behavioral: Positive for confusion. Vitals:    05/21/19 1045 05/21/19 1130 05/21/19 1139 05/21/19 1200   BP: (!) 149/95 129/79  125/70   Pulse: 87 (!) 104  86   Resp: 21 19 21   Temp:   99.2 °F (37.3 °C)    SpO2: 96%               Physical Exam   Constitutional: She appears distressed (pain ).   elderly   HENT:   Head: Normocephalic and atraumatic. Mouth/Throat: No oropharyngeal exudate. Dry mm     Eyes: Pupils are equal, round, and reactive to light. Conjunctivae are normal. Right eye exhibits no discharge. Left eye exhibits no discharge. No scleral icterus. Neck: Normal range of motion. Neck supple. Cardiovascular: Normal rate, regular rhythm and normal heart sounds. Exam reveals no gallop and no friction rub.    No murmur heard.  Pulmonary/Chest: Effort normal and breath sounds normal. No respiratory distress. She has no wheezes. She has no rales. Abdominal: Soft. She exhibits no distension. There is no tenderness. There is no guarding. Musculoskeletal: She exhibits no edema or tenderness. Decreased rom of both hips. nontender spine. Remainder of ortho frisk negative. Lymphadenopathy:     She has no cervical adenopathy. Neurological: She is alert. Not oriented, mumbles. Moves both arms. Limited leg movement due ot pain   Skin: Skin is warm and dry. No rash noted. No pallor. Nursing note and vitals reviewed. MDM      79 yo s/p unwitnessed glf now with pain. Pt with severe dementia and unable to state where pain is located. Possible back or hip pain. Check head ct, ct c-spine, ct spine, xr hips. Ua. Procedures  ED EKG interpretation:  Rhythm: normal sinus rhythm; and regular . Rate (approx.): 64; Axis: normal; ST/T wave: normal;   Note written by Carlie Victoria, as dictated by Susan Thayer MD 9:25 AM    PROGRESS NOTE:  11:53 PM  Discussed results and plan of care with daughter. PROGRESS NOTE:  4:00 PM  Updated daughter on results. Senior services consulted. PROGRESS NOTE:  4:30 PM  Pt tolerated tylenol well and ambulated with therapist.    Junaid Go PM  Patient's results have been reviewed with them. Patient and/or family have verbally conveyed their understanding and agreement of the patient's signs, symptoms, diagnosis, treatment and prognosis and additionally agree to follow up as recommended or return to the Emergency Room should their condition change prior to follow-up. Discharge instructions have also been provided to the patient with some educational information regarding their diagnosis as well a list of reasons why they would want to return to the ER prior to their follow-up appointment should their condition change.     Recent Results (from the past 24 hour(s))   EKG, 12 LEAD, INITIAL    Collection Time: 05/21/19  9:09 AM   Result Value Ref Range    Ventricular Rate 64 BPM    Atrial Rate 64 BPM    P-R Interval 134 ms    QRS Duration 110 ms    Q-T Interval 468 ms    QTC Calculation (Bezet) 482 ms    Calculated P Axis 98 degrees    Calculated R Axis 12 degrees    Calculated T Axis 12 degrees    Diagnosis       Normal sinus rhythm  When compared with ECG of 10-APR-2019 19:09,  No significant change was found  Confirmed by Kathie Lofton M.D., Lowell Hicks ([id number]) on 5/21/2019 2:55:54 PM     CBC WITH AUTOMATED DIFF    Collection Time: 05/21/19  9:20 AM   Result Value Ref Range    WBC 9.4 3.6 - 11.0 K/uL    RBC 4.02 3.80 - 5.20 M/uL    HGB 12.5 11.5 - 16.0 g/dL    HCT 38.6 35.0 - 47.0 %    MCV 96.0 80.0 - 99.0 FL    MCH 31.1 26.0 - 34.0 PG    MCHC 32.4 30.0 - 36.5 g/dL    RDW 13.2 11.5 - 14.5 %    PLATELET 796 071 - 180 K/uL    MPV 10.1 8.9 - 12.9 FL    NRBC 0.0 0  WBC    ABSOLUTE NRBC 0.00 0.00 - 0.01 K/uL    NEUTROPHILS 62 32 - 75 %    LYMPHOCYTES 29 12 - 49 %    MONOCYTES 7 5 - 13 %    EOSINOPHILS 1 0 - 7 %    BASOPHILS 0 0 - 1 %    IMMATURE GRANULOCYTES 1 (H) 0.0 - 0.5 %    ABS. NEUTROPHILS 5.9 1.8 - 8.0 K/UL    ABS. LYMPHOCYTES 2.7 0.8 - 3.5 K/UL    ABS. MONOCYTES 0.6 0.0 - 1.0 K/UL    ABS. EOSINOPHILS 0.1 0.0 - 0.4 K/UL    ABS. BASOPHILS 0.0 0.0 - 0.1 K/UL    ABS. IMM.  GRANS. 0.1 (H) 0.00 - 0.04 K/UL    DF AUTOMATED     METABOLIC PANEL, COMPREHENSIVE    Collection Time: 05/21/19  9:20 AM   Result Value Ref Range    Sodium 139 136 - 145 mmol/L    Potassium 3.7 3.5 - 5.1 mmol/L    Chloride 106 97 - 108 mmol/L    CO2 24 21 - 32 mmol/L    Anion gap 9 5 - 15 mmol/L    Glucose 88 65 - 100 mg/dL    BUN 22 (H) 6 - 20 MG/DL    Creatinine 0.85 0.55 - 1.02 MG/DL    BUN/Creatinine ratio 26 (H) 12 - 20      GFR est AA >60 >60 ml/min/1.73m2    GFR est non-AA >60 >60 ml/min/1.73m2    Calcium 9.5 8.5 - 10.1 MG/DL    Bilirubin, total 0.8 0.2 - 1.0 MG/DL    ALT (SGPT) 22 12 - 78 U/L    AST (SGOT) 16 15 - 37 U/L    Alk. phosphatase 82 45 - 117 U/L    Protein, total 7.2 6.4 - 8.2 g/dL    Albumin 3.7 3.5 - 5.0 g/dL    Globulin 3.5 2.0 - 4.0 g/dL    A-G Ratio 1.1 1.1 - 2.2     SAMPLES BEING HELD    Collection Time: 05/21/19  9:20 AM   Result Value Ref Range    SAMPLES BEING HELD 1RED,1BLUE     COMMENT        Add-on orders for these samples will be processed based on acceptable specimen integrity and analyte stability, which may vary by analyte. CK-MB,QUANT. Collection Time: 05/21/19  9:20 AM   Result Value Ref Range    CK - MB 2.5 <3.6 NG/ML    CK-MB Index 3.0 (H) 0.0 - 2.5     CK    Collection Time: 05/21/19  9:20 AM   Result Value Ref Range    CK 82 26 - 192 U/L   TROPONIN I    Collection Time: 05/21/19  9:20 AM   Result Value Ref Range    Troponin-I, Qt. <0.05 <0.05 ng/mL   URINALYSIS W/MICROSCOPIC    Collection Time: 05/21/19 11:43 AM   Result Value Ref Range    Color YELLOW/STRAW      Appearance CLEAR CLEAR      Specific gravity 1.016 1.003 - 1.030      pH (UA) 7.5 5.0 - 8.0      Protein NEGATIVE  NEG mg/dL    Glucose NEGATIVE  NEG mg/dL    Ketone 15 (A) NEG mg/dL    Bilirubin NEGATIVE  NEG      Blood MODERATE (A) NEG      Urobilinogen 1.0 0.2 - 1.0 EU/dL    Nitrites NEGATIVE  NEG      Leukocyte Esterase SMALL (A) NEG      WBC 0-4 0 - 4 /hpf    RBC 0-5 0 - 5 /hpf    Epithelial cells FEW FEW /lpf    Bacteria NEGATIVE  NEG /hpf   URINE CULTURE HOLD SAMPLE    Collection Time: 05/21/19 11:43 AM   Result Value Ref Range    Urine culture hold        URINE ON HOLD IN MICROBIOLOGY DEPT FOR 3 DAYS. IF UNPRESERVED URINE IS SUBMITTED, IT CANNOT BE USED FOR ADDITIONAL TESTING AFTER 24 HRS, RECOLLECTION WILL BE REQUIRED. Xr Chest Sngl V    Result Date: 5/21/2019  EXAM: XR CHEST SNGL V INDICATION: confusion COMPARISON: 4/11/2019. FINDINGS: A single view of the chest was performed. The patient is on a cardiac monitor.  The heart size normal. The lungs are clear except for mild scarring at the left lung base medially. There is a moderate hiatal hernia. Visualized osseous structures are unremarkable. IMPRESSION: 1. No acute process. Ct Head Wo Cont    Result Date: 5/21/2019  EXAM: CT HEAD WO CONT INDICATION: increased confusion. fall COMPARISON: 2019. CONTRAST: None. TECHNIQUE: Unenhanced CT of the head was performed using 5 mm images. Brain and bone windows were generated. CT dose reduction was achieved through use of a standardized protocol tailored for this examination and automatic exposure control for dose modulation. FINDINGS: There is moderate prominence of ventricles and sulci. There are mild changes small vessel disease periventricular white matter. No hemorrhage mass or acute infarction is identified. The bony structures appear intact. IMPRESSION: No acute intracranial process is identified. Ct Spine Cerv Wo Cont    Result Date: 5/21/2019  EXAM:  CT CERVICAL SPINE WITHOUT CONTRAST INDICATION: spine pain, fall. COMPARISON: 4/10/2019 CONTRAST:  None. TECHNIQUE: Multislice helical CT of the cervical spine was performed without intravenous contrast administration. Sagittal and coronal reconstructions were generated. CT dose reduction was achieved through use of a standardized protocol tailored for this examination and automatic exposure control for dose modulation. FINDINGS: Alignment is intact. . There are degenerative changes C6-C7. There is slight increasing sclerosis superior endplate of T1 and T2 which are minimally compressed and these are new findings since 4/10/2019. IMPRESSION:There is slight sclerosis in the superior endplates of T1 and T2 which are minimally compressed compared to the prior study may represent slight superior endplate fractures. No cervical spine fracture is identified. Ct Spine Thorac Wo Cont    Result Date: 5/21/2019  EXAM:  CT SPINE [facility] WO CONT INDICATION: . COMPARISON: None. TECHNIQUE: Multislice helical CT of the thoracic spine was performed.  Sagittal and coronal reformations were generated. CT dose reduction was achieved through use of a standardized protocol tailored for this examination and automatic exposure control for dose modulation. FINDINGS: There is a moderately large hiatal hernia. The alignment of the thoracic spine is normal. The vertebral body heights and disc spaces are well-preserved. There is slight sclerosis of the superior endplates of T1 and T2 which are minimally compressed and these are new findings since CT cervical spine dated 4/10/2019  There is no spinal canal stenosis. IMPRESSION: There is slight sclerosis in the superior endplates of T1 and T2 which are minimally compressed may represent minor superior plate fractures and these findings are new since 4/10/2019. Ct Spine Lumb Wo Cont    Result Date: 5/21/2019  EXAM: CT SPINE LUMB WO CONT INDICATION: back pain COMPARISON: CT abdomen 2018. TECHNIQUE:   Unenhanced multislice helical CT of the lumbar spine was performed in the axial plane. Coronal and sagittal reconstructions were obtained. CT dose reduction was achieved through use of a standardized protocol tailored for this examination and automatic exposure control for dose modulation. FINDINGS: There is slight levoconvex scoliosis. No acute fractures identified. There is a fracture of the transverse process of L3 which appears subacute with callus formation. There is 2 mm of anterolisthesis of L4 on L5 which appears to be degenerative. There is facet arthropathy L2-L5. There is disc space narrowing L1-L2, L2-L3 L3-L4 levels. There is moderate central stenosis at L2-L3. There is moderately severe central stenosis at L3-L4. There is moderately severe to severe central stenosis at L4-L5. Urinary bladder is distended. There is a moderately large hiatal hernia. IMPRESSION: There is a fracture of the left L3 transverse process which appears subacute since there is callus formation. No acute fracture is identified.  There are degenerative changes described above. Ct Pelv Wo Cont    Result Date: 5/21/2019  EXAM: CT PELV WO CONT INDICATION: BILATERAL HIP PAIN. Fall. COMPARISON: Radiographs 5/21/2019. CONTRAST: None. TECHNIQUE: Multislice helical CT was performed from the iliac crest to the symphysis pubis without intravenous contrast administration . Oral contrast was not administered. Coronal and sagittal reformations were generated. CT dose reduction was achieved through use of a standardized protocol tailored for this examination and automatic exposure control for dose modulation. FINDING: The bones are osteopenic. There is a healing nondisplaced fracture of the left L3 transverse process. There is grade 1 anterolisthesis of L4 on L5. There is severe spondylosis at L3-4. There is moderate spondylosis at L4-5. Significant posterior facet arthropathy is seen in the lower lumbar spine. There is an angulation of the anterior S3 vertebral body related to an acute compression fracture. Horizontal fractures extend through the posterior elements as well. Mild degenerative changes are seen in the bilateral sacroiliac joints. Moderate degenerative changes seen in the pubic symphysis. 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 There is distention of the bladder. Multiple diverticula are seen in the sigmoid colon. Presacral soft tissue stranding is likely related to hematoma from the S3 fracture. There is an area of subcutaneous stranding and fluid in the right buttock likely to hematoma. 1. Acute fracture of the anterior S3 vertebral body with horizontal fractures extending into the posterior elements. Presacral stranding is likely related to hematoma. 2. Healing fracture of the left L3 transverse process. 3. Hematoma in the subcutaneous tissues of the right buttock. Xr Hips Bi W Ap Pelv    Result Date: 5/21/2019  EXAM: XR HIPS BI W AP PELV INDICATION: pain, fall. COMPARISON: None. FINDINGS: 3 views bilateral hips.  AP view of the pelvis and frogleg lateral views of both hips demonstrate no fracture, dislocation or other acute abnormality. There are mild degenerative changes.      IMPRESSION: No acute fracture identified

## 2019-05-21 NOTE — PROGRESS NOTES
Orders received, chart reviewed and patient evaluated by physical therapy. At baseline patient performed her own transfers and ambulated with no device with supervision d/t her memory impairment. On assessment today, patient required assistance for bed mobility, transfers and ambulation 30 ft with a rolling walker with Minimum assistance x2. Gait without a device was attempted. Patient unable. Recommend patient discharge back to her memory care facility with increased staff assistance and physical therapy. Daughter feels she will be able to transport patient back to the facility with ED staff assistance to get her into the car and memory care facility staff assistance getting her out of the car. Patient will require a wheelchair on arrival.      Report provided to care management and ED MD.  Alejandrina assisted with mobilization and already aware. Full evaluation to follow.

## 2019-05-21 NOTE — ED NOTES
Placed pt on bedpan as per request by daughter. Pt's daughter now stating that she is c/o pain d/t the bedpan and wants to be removed from it. RN Jose Fair in room.

## 2019-05-21 NOTE — DISCHARGE INSTRUCTIONS
Patient Education        Lumbar Spinal Stenosis: Care Instructions  Your Care Instructions    Stenosis in the spine is a narrowing of the canal that is around the spinal cord and nerve roots in your back. It can happen as part of aging. Sometimes bone and other tissue grow into this canal and press on the nerves that branch out from the spinal cord. This can cause pain, numbness, and weakness. When it happens in the lower part of your back, it is called lumbar spinal stenosis. It can cause problems in the legs, feet, and rear end (buttocks). You may be able to get relief from the symptoms of spinal stenosis by taking pain medicine. Your doctor may suggest physical therapy and exercises to keep your spine strong and flexible. Some people try steroid shots to reduce swelling. If pain and numbness in your legs are still so bad that you cannot do your normal activities, you may need surgery. Follow-up care is a key part of your treatment and safety. Be sure to make and go to all appointments, and call your doctor if you are having problems. It's also a good idea to know your test results and keep a list of the medicines you take. How can you care for yourself at home? · Take an over-the-counter pain medicine. Nonsteroidal anti-inflammatory drugs (NSAIDs) such as ibuprofen or naproxen seem to work best. But if you can't take NSAIDs, you can try acetaminophen. Be safe with medicines. Read and follow all instructions on the label. · Do not take two or more pain medicines at the same time unless the doctor told you to. Many pain medicines have acetaminophen, which is Tylenol. Too much acetaminophen (Tylenol) can be harmful. · Stay at a healthy weight. Being overweight puts extra strain on your spine. · Change positions often when you sit or stand. This can ease pain. It may also reduce pressure on the spinal cord and its nerves. · Avoid doing things that make your symptoms worse.  Walking downhill and standing for a long time may cause pain. · Stretch and strengthen your back muscles as your doctor or physical therapist recommends. If your doctor says it is okay to do them, these exercises may help. ? Lie on your back with your knees bent. Gently pull one bent knee to your chest. Put that foot back on the floor, and then pull the other knee to your chest.  ? Do pelvic tilts. Lie on your back with your knees bent. Tighten your stomach muscles. Pull your belly button (navel) in and up toward your ribs. You should feel like your back is pressing to the floor and your hips and pelvis are slightly lifting off the floor. Hold for 6 seconds while breathing smoothly. ? Stand with your back flat against a wall. Slowly slide down until your knees are slightly bent. Hold for 10 seconds, then slide back up the wall. · Remove or change anything in your house that may cause you to fall. Keep walkways clear of clutter, electrical cords, and throw rugs. When should you call for help? Call 911 anytime you think you may need emergency care. For example, call if:    · You are unable to move a leg at all.   [facility] your doctor now or seek immediate medical care if:    · You have new or worse symptoms in your legs, belly, or buttocks. Symptoms may include:  ? Numbness or tingling. ? Weakness. ? Pain.     · You lose bladder or bowel control.    Watch closely for changes in your health, and be sure to contact your doctor if:    · You have a fever, lose weight, or don't feel well.     · You are not getting better as expected. Where can you learn more? Go to http://jamal-triny.info/. Jennifer Sauer in the search box to learn more about \"Lumbar Spinal Stenosis: Care Instructions. \"  Current as of: September 20, 2018  Content Version: 11.9  © 1554-6746 [employer], [employer]. Care instructions adapted under license by [employer] (which disclaims liability or warranty for this information).  If you have questions about a medical condition or this instruction, always ask your healthcare professional. [address] any warranty or liability for your use of this information. Patient Education        Sacral Fracture: Care Instructions  Your Care Instructions    A sacral fracture occurs when a bone called the sacrum breaks. The sacrum is a large triangular bone at the bottom of the spine. It fits like a wedge between the two hipbones. The sacrum is made up of the sacral vertebrae, which are fused together. Sometimes the coccyx, or tailbone, is fractured along with the sacrum. The tailbone is the small bone at the bottom of the spine. It is attached to the sacral bone above it. In some cases, an injury to the sacrum can affect the nerves that control the bladder, bowel, or legs. Home treatment may be all that is needed for some sacral fractures. If a fracture is severe or affects nerves, you may need surgery. You heal best when you take good care of yourself. Eat a variety of healthy foods, and don't smoke. Follow-up care is a key part of your treatment and safety. Be sure to make and go to all appointments, and call your doctor if you are having problems. It's also a good idea to know your test results and keep a list of the medicines you take. How can you care for yourself at home? · Follow your doctor's instructions for bed rest and activity. · Take pain medicines exactly as directed. ? If the doctor gave you a prescription medicine for pain, take it as prescribed. ? If you are not taking a prescription pain medicine, ask your doctor if you can take an over-the-counter medicine. · Put ice or a cold pack on the painful area for 10 to 20 minutes at a time. Try to do this every 1 to 2 hours for the next 3 days (when you are awake). Put a thin cloth between the ice and your skin or brace. · Do not sit on hard, unpadded surfaces.  Sit on a doughnut-shaped pillow to take pressure off the tailbone area. · Put only as much weight on each leg as your doctor tells you to. He or she may advise you to use crutches, a walker, or a cane to help you walk. · Avoid constipation, because straining to have a bowel movement will increase your pain. ? Include fruits, vegetables, beans, and whole grains in your diet each day. These foods are high in fiber. ? Drink plenty of fluids, enough so that your urine is light yellow or clear like water. If you have kidney, heart, or liver disease and have to limit fluids, talk with your doctor before you increase your fluid intake. When should you call for help? Call 911 anytime you think you may need emergency care. For example, call if:    · You are unable to move a leg at all.   [facility] your doctor now or seek immediate medical care if:    · You have new or worse symptoms in your legs or buttocks. Symptoms may include:  ? Numbness or tingling. ? Weakness. ? Pain.     · You lose bladder or bowel control.    Watch closely for changes in your health, and be sure to contact your doctor if:    · You are not getting better as expected. Where can you learn more? Go to http://jamal-triny.info/. Enter [phone] in the search box to learn more about \"Sacral Fracture: Care Instructions. \"  Current as of: September 20, 2018  Content Version: 11.9  © 8286-2653 [employer]. Care instructions adapted under license by [employer] (which disclaims liability or warranty for this information). If you have questions about a medical condition or this instruction, always ask your healthcare professional. [place] [address] any warranty or liability for your use of this information.          Recent Results (from the past 24 hour(s))   EKG, 12 LEAD, INITIAL    Collection Time: 05/21/19  9:09 AM   Result Value Ref Range    Ventricular Rate 64 BPM    Atrial Rate 64 BPM    P-R Interval 134 ms    QRS Duration 110 ms    Q-T Interval 468 ms    QTC Calculation (Bezet) 482 ms    Calculated P Axis 98 degrees    Calculated R Axis 12 degrees    Calculated T Axis 12 degrees    Diagnosis       Normal sinus rhythm  When compared with ECG of 10-APR-2019 19:09,  No significant change was found  Confirmed by Suleiman Vivas M.D., Issa Cashjaime ([id number]) on 5/21/2019 2:55:54 PM     CBC WITH AUTOMATED DIFF    Collection Time: 05/21/19  9:20 AM   Result Value Ref Range    WBC 9.4 3.6 - 11.0 K/uL    RBC 4.02 3.80 - 5.20 M/uL    HGB 12.5 11.5 - 16.0 g/dL    HCT 38.6 35.0 - 47.0 %    MCV 96.0 80.0 - 99.0 FL    MCH 31.1 26.0 - 34.0 PG    MCHC 32.4 30.0 - 36.5 g/dL    RDW 13.2 11.5 - 14.5 %    PLATELET 147 780 - 616 K/uL    MPV 10.1 8.9 - 12.9 FL    NRBC 0.0 0  WBC    ABSOLUTE NRBC 0.00 0.00 - 0.01 K/uL    NEUTROPHILS 62 32 - 75 %    LYMPHOCYTES 29 12 - 49 %    MONOCYTES 7 5 - 13 %    EOSINOPHILS 1 0 - 7 %    BASOPHILS 0 0 - 1 %    IMMATURE GRANULOCYTES 1 (H) 0.0 - 0.5 %    ABS. NEUTROPHILS 5.9 1.8 - 8.0 K/UL    ABS. LYMPHOCYTES 2.7 0.8 - 3.5 K/UL    ABS. MONOCYTES 0.6 0.0 - 1.0 K/UL    ABS. EOSINOPHILS 0.1 0.0 - 0.4 K/UL    ABS. BASOPHILS 0.0 0.0 - 0.1 K/UL    ABS. IMM. GRANS. 0.1 (H) 0.00 - 0.04 K/UL    DF AUTOMATED     METABOLIC PANEL, COMPREHENSIVE    Collection Time: 05/21/19  9:20 AM   Result Value Ref Range    Sodium 139 136 - 145 mmol/L    Potassium 3.7 3.5 - 5.1 mmol/L    Chloride 106 97 - 108 mmol/L    CO2 24 21 - 32 mmol/L    Anion gap 9 5 - 15 mmol/L    Glucose 88 65 - 100 mg/dL    BUN 22 (H) 6 - 20 MG/DL    Creatinine 0.85 0.55 - 1.02 MG/DL    BUN/Creatinine ratio 26 (H) 12 - 20      GFR est AA >60 >60 ml/min/1.73m2    GFR est non-AA >60 >60 ml/min/1.73m2    Calcium 9.5 8.5 - 10.1 MG/DL    Bilirubin, total 0.8 0.2 - 1.0 MG/DL    ALT (SGPT) 22 12 - 78 U/L    AST (SGOT) 16 15 - 37 U/L    Alk.  phosphatase 82 45 - 117 U/L    Protein, total 7.2 6.4 - 8.2 g/dL    Albumin 3.7 3.5 - 5.0 g/dL    Globulin 3.5 2.0 - 4.0 g/dL    A-G Ratio 1.1 1.1 - 2.2     SAMPLES BEING HELD    Collection Time: 05/21/19  9:20 AM   Result Value Ref Range    SAMPLES BEING HELD 1RED,1BLUE     COMMENT        Add-on orders for these samples will be processed based on acceptable specimen integrity and analyte stability, which may vary by analyte. CK-MB,QUANT. Collection Time: 05/21/19  9:20 AM   Result Value Ref Range    CK - MB 2.5 <3.6 NG/ML    CK-MB Index 3.0 (H) 0.0 - 2.5     CK    Collection Time: 05/21/19  9:20 AM   Result Value Ref Range    CK 82 26 - 192 U/L   TROPONIN I    Collection Time: 05/21/19  9:20 AM   Result Value Ref Range    Troponin-I, Qt. <0.05 <0.05 ng/mL   URINALYSIS W/MICROSCOPIC    Collection Time: 05/21/19 11:43 AM   Result Value Ref Range    Color YELLOW/STRAW      Appearance CLEAR CLEAR      Specific gravity 1.016 1.003 - 1.030      pH (UA) 7.5 5.0 - 8.0      Protein NEGATIVE  NEG mg/dL    Glucose NEGATIVE  NEG mg/dL    Ketone 15 (A) NEG mg/dL    Bilirubin NEGATIVE  NEG      Blood MODERATE (A) NEG      Urobilinogen 1.0 0.2 - 1.0 EU/dL    Nitrites NEGATIVE  NEG      Leukocyte Esterase SMALL (A) NEG      WBC 0-4 0 - 4 /hpf    RBC 0-5 0 - 5 /hpf    Epithelial cells FEW FEW /lpf    Bacteria NEGATIVE  NEG /hpf   URINE CULTURE HOLD SAMPLE    Collection Time: 05/21/19 11:43 AM   Result Value Ref Range    Urine culture hold        URINE ON HOLD IN MICROBIOLOGY DEPT FOR 3 DAYS. IF UNPRESERVED URINE IS SUBMITTED, IT CANNOT BE USED FOR ADDITIONAL TESTING AFTER 24 HRS, RECOLLECTION WILL BE REQUIRED. Xr Chest Sngl V    Result Date: 5/21/2019  EXAM: XR CHEST SNGL V INDICATION: confusion COMPARISON: 4/11/2019. FINDINGS: A single view of the chest was performed. The patient is on a cardiac monitor. The heart size normal. The lungs are clear except for mild scarring at the left lung base medially. There is a moderate hiatal hernia. Visualized osseous structures are unremarkable. IMPRESSION: 1. No acute process.     Ct Head Wo Cont    Result Date: 5/21/2019  EXAM: CT HEAD WO CONT INDICATION: increased confusion. fall COMPARISON: 2019. CONTRAST: None. TECHNIQUE: Unenhanced CT of the head was performed using 5 mm images. Brain and bone windows were generated. CT dose reduction was achieved through use of a standardized protocol tailored for this examination and automatic exposure control for dose modulation. FINDINGS: There is moderate prominence of ventricles and sulci. There are mild changes small vessel disease periventricular white matter. No hemorrhage mass or acute infarction is identified. The bony structures appear intact. IMPRESSION: No acute intracranial process is identified. Ct Spine Cerv Wo Cont    Result Date: 5/21/2019  EXAM:  CT CERVICAL SPINE WITHOUT CONTRAST INDICATION: spine pain, fall. COMPARISON: 4/10/2019 CONTRAST:  None. TECHNIQUE: Multislice helical CT of the cervical spine was performed without intravenous contrast administration. Sagittal and coronal reconstructions were generated. CT dose reduction was achieved through use of a standardized protocol tailored for this examination and automatic exposure control for dose modulation. FINDINGS: Alignment is intact. . There are degenerative changes C6-C7. There is slight increasing sclerosis superior endplate of T1 and T2 which are minimally compressed and these are new findings since 4/10/2019. IMPRESSION:There is slight sclerosis in the superior endplates of T1 and T2 which are minimally compressed compared to the prior study may represent slight superior endplate fractures. No cervical spine fracture is identified. Ct Spine Thorac Wo Cont    Result Date: 5/21/2019  EXAM:  CT SPINE [facility] WO CONT INDICATION: . COMPARISON: None. TECHNIQUE: Multislice helical CT of the thoracic spine was performed. Sagittal and coronal reformations were generated. CT dose reduction was achieved through use of a standardized protocol tailored for this examination and automatic exposure control for dose modulation. FINDINGS: There is a moderately large hiatal hernia. The alignment of the thoracic spine is normal. The vertebral body heights and disc spaces are well-preserved. There is slight sclerosis of the superior endplates of T1 and T2 which are minimally compressed and these are new findings since CT cervical spine dated 4/10/2019  There is no spinal canal stenosis. IMPRESSION: There is slight sclerosis in the superior endplates of T1 and T2 which are minimally compressed may represent minor superior plate fractures and these findings are new since 4/10/2019. Ct Spine Lumb Wo Cont    Result Date: 5/21/2019  EXAM: CT SPINE LUMB WO CONT INDICATION: back pain COMPARISON: CT abdomen 2018. TECHNIQUE:   Unenhanced multislice helical CT of the lumbar spine was performed in the axial plane. Coronal and sagittal reconstructions were obtained. CT dose reduction was achieved through use of a standardized protocol tailored for this examination and automatic exposure control for dose modulation. FINDINGS: There is slight levoconvex scoliosis. No acute fractures identified. There is a fracture of the transverse process of L3 which appears subacute with callus formation. There is 2 mm of anterolisthesis of L4 on L5 which appears to be degenerative. There is facet arthropathy L2-L5. There is disc space narrowing L1-L2, L2-L3 L3-L4 levels. There is moderate central stenosis at L2-L3. There is moderately severe central stenosis at L3-L4. There is moderately severe to severe central stenosis at L4-L5. Urinary bladder is distended. There is a moderately large hiatal hernia. IMPRESSION: There is a fracture of the left L3 transverse process which appears subacute since there is callus formation. No acute fracture is identified. There are degenerative changes described above. Ct Pelv Wo Cont    Result Date: 5/21/2019  EXAM: CT PELV WO CONT INDICATION: BILATERAL HIP PAIN. Fall. COMPARISON: Radiographs 5/21/2019. CONTRAST: None. TECHNIQUE: Multislice helical CT was performed from the iliac crest to the symphysis pubis without intravenous contrast administration . Oral contrast was not administered. Coronal and sagittal reformations were generated. CT dose reduction was achieved through use of a standardized protocol tailored for this examination and automatic exposure control for dose modulation. FINDING: The bones are osteopenic. There is a healing nondisplaced fracture of the left L3 transverse process. There is grade 1 anterolisthesis of L4 on L5. There is severe spondylosis at L3-4. There is moderate spondylosis at L4-5. Significant posterior facet arthropathy is seen in the lower lumbar spine. There is an angulation of the anterior S3 vertebral body related to an acute compression fracture. Horizontal fractures extend through the posterior elements as well. Mild degenerative changes are seen in the bilateral sacroiliac joints. Moderate degenerative changes seen in the pubic symphysis. 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 There is distention of the bladder. Multiple diverticula are seen in the sigmoid colon. Presacral soft tissue stranding is likely related to hematoma from the S3 fracture. There is an area of subcutaneous stranding and fluid in the right buttock likely to hematoma. 1. Acute fracture of the anterior S3 vertebral body with horizontal fractures extending into the posterior elements. Presacral stranding is likely related to hematoma. 2. Healing fracture of the left L3 transverse process. 3. Hematoma in the subcutaneous tissues of the right buttock. Xr Hips Bi W Ap Pelv    Result Date: 5/21/2019  EXAM: XR HIPS BI W AP PELV INDICATION: pain, fall. COMPARISON: None. FINDINGS: 3 views bilateral hips. AP view of the pelvis and frogleg lateral views of both hips demonstrate no fracture, dislocation or other acute abnormality. There are mild degenerative changes.      IMPRESSION: No acute fracture identified

## 2019-05-21 NOTE — PROGRESS NOTES
physical Therapy Emergency Department EVALUATION/DISCHARGE  Patient: Alethea Davis (6 y.o. female)  Date: 5/21/2019  Primary Diagnosis: No admission diagnoses are documented for this encounter. Precautions: fall     ASSESSMENT :  Patient presented to the ED after being found on the floor in front of her chair. She was not able to walk at her baseline after her fall. CT shows \"acute fracture of the anterior S3 vertebral body with horizontal fractures extending into the posterior elements. \"  MD cleared for therapy activity. On exam today, patient required assistance for bed mobility, transfers and ambulation 30 ft with a rolling walker with Minimum assistance x2. Gait without a device was attempted. Patient unable. Discussed with care management who spoke with the facility and informed they are able to accept patient back in her current functional level. Recommend patient discharge back to her memory care facility with increased staff assistance and physical therapy. Daughter feels she will be able to transport patient back to the facility with ED staff assistance to get her into the car and memory care facility staff assistance getting her out of the car. Patient will require a wheelchair on arrival.       Report provided to care management and ED MD.  Alejandrina assisted with mobilization and already aware. Further acute physical therapy is not indicated at this time. PLAN :  Discharge Recommendations:     []   Home with family  []   Skilled nursing facility  []   Admission to hospital with rehab likely needed  []   Inpatient rehab referral  []   Outpatient physical therapy referral  [x]   Other: Return to the memory care unit with increased staff supervision and assistance with transfers and ambulation and physical therapy.   Further Equipment Recommendations for Discharge:  Defer to facility  []   Rolling walker with 5\" wheels  []   Crutches   []   Leticia Bacca   []   Wheelchair   []   Other: COMMUNICATION/EDUCATION:   Communication/Collaboration:  [x]   Fall prevention education was provided and the patient/caregiver indicated understanding. [x]   Patient/family have participated as able and agree with findings and recommendations. []   Patient is unable to participate in plan of care at this time. Findings and recommendations were discussed with: PhysicianRegistered Nurse and        SUBJECTIVE:   Patient stated n/a - pt w/ dementia and limited conversation ability.     OBJECTIVE DATA SUMMARY:   HISTORY:    Past Medical History:   Diagnosis Date    Basal cell carcinoma     face    Cataract     bilateral    Depression     Muscle ache     Muscle pain     Stiff joint     Thyroid disease      Past Surgical History:   Procedure Laterality Date    HX CATARACT REMOVAL      bilateral    HX CHOLECYSTECTOMY      HX HYSTERECTOMY      partial still has ovarias.  HX OTHER SURGICAL      basal cell removal on face    HX OTHER SURGICAL      stapedectomy-not sure which ear     Prior Level of Function/Home Situation: Resides in a memory care facility ([facility]) x3 wks. Previously lived with her daughter who provided her care. Daughter is present in the ED, appears supportive. Able to perform own transfers and walk w/o a device. History of falls. EXAMINATION/PRESENTATION/DECISION MAKING:   Critical Behavior:  Neurologic State: Confused  Orientation Level: Disoriented to situation, Disoriented to place, Disoriented to person, Disoriented to time     Range Of Motion:     Grossly functional                    Strength:    Decreased, functional                  Coordination:  Unable to establish. Poor command following. Functional Mobility:  Bed Mobility:  Prior to this PT visit, observed patient independently move around in bed to include supine to sit though she required assistance with this during this assessment due to dementia and poor command following.   Transfers:  Sit to stand to sit: Minimum assistance  Balance:   Sitting, unsupported: intact  Standing, supported: fair  Ambulation/Gait Training:  Ambulatory with rolling walker with min A to guide the walker 50 ft. Special Tests:  Barthel Index:    Bathin  Bladder: 0  Bowels: 0  Groomin  Dressin  Feedin  Mobility: 0  Stairs: 0  Toilet Use: 0  Transfer (Bed to Chair and Back): 5  Total: 10/100       Percentage of impairment   0%   1-19%   20-39%   40-59%   60-79%   80-99%   100%   Barthel Score 0-100 100 99-80 79-60 59-40 20-39 1-19   0     The Barthel ADL Index: Guidelines  1. The index should be used as a record of what a patient does, not as a record of what a patient could do. 2. The main aim is to establish degree of independence from any help, physical or verbal, however minor and for whatever reason. 3. The need for supervision renders the patient not independent. 4. A patient's performance should be established using the best available evidence. Asking the patient, friends/relatives and nurses are the usual sources, but direct observation and common sense are also important. However direct testing is not needed. 5. Usually the patient's performance over the preceding 24-48 hours is important, but occasionally longer periods will be relevant. 6. Middle categories imply that the patient supplies over 50 per cent of the effort. 7. Use of aids to be independent is allowed. Matteo Jaquez., Barthel, D.W. (5430). Functional evaluation: the Barthel Index. [address] [facility] (14)2. Ana Crum, NHIJYENI, Elia Beal.Reji., Ira Miguel, [facility] (). Measuring the change indisability after inpatient rehabilitation; comparison of the responsiveness of the Barthel Index and Functional [place] Measure. Journal of Neurology, Neurosurgery, and Psychiatry, 66(4), 158-954.   Karina Azul, AMI.J.A, CHARLI Vizcarra, & Deepika Gonzales MFerchoA. (2004.) Assessment of post-stroke quality of life in cost-effectiveness studies: The usefulness of the Barthel Index and the EuroQoL-5D. Quality of Life Research, 15, 857-10          Physical Therapy Evaluation Charge Determination   History Examination Presentation Decision-Making   LOW Complexity : Zero comorbidities / personal factors that will impact the outcome / POC LOW Complexity : 1-2 Standardized tests and measures addressing body structure, function, activity limitation and / or participation in recreation  LOW Complexity : Stable, uncomplicated  LOW Complexity : FOTO score of       Based on the above components, the patient evaluation is determined to be of the following complexity level: LOW       Activity Tolerance:   fair  Please refer to the flowsheet for vital signs taken during this treatment.   After treatment:   []         Patient left in no apparent distress sitting up in chair  [x]         Patient left in no apparent distress in bed  [x]         Call bell left within reach  [x]         Nursing aware  [x]         Caregiver present  []         Bed alarm activated    Thank you for this referral.  Placido Torres, PT   Time Calculation: 24 mins

## 2019-05-21 NOTE — PROGRESS NOTES
Physical Therapy Screening:  A referral to physical therapy order is indicated when the patient is medically stable. A screening was performed on this patient's chart based on their entrance into the emergency department and potential need for physical therapy identified. The patients chart was reviewed and the patient interviewed/screened and found to be appropriate for a skilled therapy evaluation. Please consult for physical therapy if you would like an evaluation to be completed. Thank you. 1230 - Met with patient and her daughter. Patient has dementia. Information was obtained from her daughter. Patient was found on the floor in front of her chair at her memory care unit. She was assisted off the floor and ambulation attempted with patient having difficulty. At baseline, patient is ambulatory on her own with no device. There is a fall history. Patient may benefit from a therapy assessment in the ED to establish patient's mobility status to inform staff at the memory care unit if patient needs additional assistance when up.

## 2019-05-21 NOTE — ED NOTES
Discharge instructions reviewed by provider; pt verbalized understanding of discharge and medication use. Vitals updated, IV DC'ed and wheeled from ED to daughter's car. No apparent distress noted.

## 2019-05-21 NOTE — PROGRESS NOTES
SSED consult received. Concern is sacral fracture and pt currently lives in memory care at [facility], only has been there 3 weeks, prior had been living at home with daughter where she also had a history of falls. Currently SSED PT completed mobility assessment in ED. BJ MCRAE will also follow up after this evaluation is completed. [facility]  [email or web address]. [employer]   [address], [address]   [phone]    BJ called facility and was connected to Rhonda to determine if pt can return to facility. Rhonda doesn't anticipate an issue with pt's return to facility and they can provide additional supports as needed. BJ faxed initial ED clinical information to [id number] [place] [address] at [phone] for her review. She is inquiring if new meds needed. They do have an in house therapy dept which will start to work with her. They will also evaluate for and retain walker for her if needed.     JU Rodriguez

## 2019-05-23 ENCOUNTER — TELEPHONE (OUTPATIENT)
Dept: INTERNAL MEDICINE CLINIC | Age: 84
End: 2019-05-23

## 2019-05-23 LAB
BACTERIA SPEC CULT: NORMAL
CC UR VC: NORMAL
SERVICE CMNT-IMP: NORMAL

## 2019-05-23 RX ORDER — CYANOCOBALAMIN (VITAMIN B-12) 500 MCG
TABLET ORAL
Qty: 90 TAB | Refills: 0 | Status: SHIPPED | OUTPATIENT
Start: 2019-05-23 | End: 2019-08-19 | Stop reason: SDUPTHER

## 2019-05-23 RX ORDER — CHOLECALCIFEROL (VITAMIN D3) 125 MCG
CAPSULE ORAL
Qty: 90 TAB | Refills: 0 | Status: SHIPPED | OUTPATIENT
Start: 2019-05-23 | End: 2019-08-19 | Stop reason: SDUPTHER

## 2019-05-23 NOTE — TELEPHONE ENCOUNTER
Spoke with pt's daughter Won Eaton in regards to fax MD received on 5/21/19 from [facility]. Monurol was prescribed for pt but not covered by insurance. Will cost $390/month. I had spoken with Wonhyun Eaton back on 5/16/19 about this and she was going to talk with pharmacist and get back to us. She states she spoke with Hanny Dillon and she was going to call insurance to see about getting this approved. Has not heard anything. Will forward to MD and Hanny Dillon.

## 2019-05-24 ENCOUNTER — PATIENT OUTREACH (OUTPATIENT)
Dept: INTERNAL MEDICINE CLINIC | Age: 84
End: 2019-05-24

## 2019-05-24 DIAGNOSIS — E06.3 AUTOIMMUNE HYPOTHYROIDISM: ICD-10-CM

## 2019-05-24 NOTE — TELEPHONE ENCOUNTER
Call daughter - MD wanted to know did the insurance turn this down previously? We've used it several times. She states no but George Amauri told her pt had infection at that time were this time ordering as a preventative med. Her first choice would be try a peer to peer review to see if they'll cover. Her second choice would be order one week at a time to see if they cover this but she feels first choice would be best instead of dealing with insurance weekly.  Will forward to MD.

## 2019-05-24 NOTE — PROGRESS NOTES
Patient has graduated from the Transitions of Care Coordination  program on 5/24/19. Patient's symptoms are stable at this time. Patient remains at [facility]. NN spoke with Mathieu Smith medication tech at [facility]. Patient had fall on 5/21/19. Med tech reports patient was seen at hospital, noted to have old fractures, on Tylenol for complaint of pain at coccyx and is doing well since fall. [facility] will continue to monitor patient while in their care. There was no expected discharge date at this time. Care management goals have been completed at this time. No further nurse navigator follow up scheduled. Pt family has nurse navigator's contact information for any further questions, concerns, or needs. Patients upcoming visits:  No future appointments.

## 2019-05-29 RX ORDER — LEVOTHYROXINE SODIUM 112 UG/1
TABLET ORAL
Qty: 90 TAB | Refills: 1 | OUTPATIENT
Start: 2019-05-29

## 2019-05-29 RX ORDER — ACETAMINOPHEN 325 MG/1
650 TABLET ORAL
Qty: 50 TAB | Refills: 0 | OUTPATIENT
Start: 2019-05-29

## 2019-05-30 ENCOUNTER — TELEPHONE (OUTPATIENT)
Dept: INTERNAL MEDICINE CLINIC | Age: 84
End: 2019-05-30

## 2019-05-30 NOTE — TELEPHONE ENCOUNTER
Advised pt's daughter Tyler Ochoa that Galileo Carson has been approved by [facility] 5/29/19 - 12/31/19 exp. Will call [facility] to let them know also.

## 2019-05-30 NOTE — TELEPHONE ENCOUNTER
----- Message from Ximena Stone MD sent at 5/30/2019 10:01 AM EDT -----  Regarding: RE: Emily Campbell- awesome !! Team- call daughter and skilled nursing- institute orders as previously given  ----- Message -----  From: Jet Russell  Sent: 5/30/2019   9:41 AM  To: Ximena Stone MD  Subject: [facility]                                          Per [employer] Monurol has been approved 5/29/19 - 12/31/19 exp.

## 2019-05-30 NOTE — TELEPHONE ENCOUNTER
----- Message from Jenniffer Marks sent at 5/30/2019  9:41 AM EDT -----  Regarding: Monurol  Per [employer] Monurol has been approved 5/29/19 - 12/31/19 exp.

## 2019-05-30 NOTE — TELEPHONE ENCOUNTER
Spoke with Kevin Gallardo, [address] at [facility]. Advised her pt's Monurol has been approved by [facility] as of 5/29/19 - 12/31/19 exp. MD advises to institute orders as previously given. She will resend to pharmacy. If any problems will call back.  Will forward to MD.

## 2019-05-30 NOTE — TELEPHONE ENCOUNTER
----- Message from Juan Jose Carvalho MD sent at 5/30/2019 10:01 AM EDT -----  Regarding: RE: Maria Velasquez- awesome !! Team- call daughter and sharonda- institute orders as previously given  ----- Message -----  From: Alvarado Dimas  Sent: 5/30/2019   9:41 AM  To: Juan Jose Carvalho MD  Subject: [facility]                                          Per [employer] Monurol has been approved 5/29/19 - 12/31/19 exp.

## 2019-05-31 ENCOUNTER — TELEPHONE (OUTPATIENT)
Dept: INTERNAL MEDICINE CLINIC | Age: 84
End: 2019-05-31

## 2019-06-07 ENCOUNTER — TELEPHONE (OUTPATIENT)
Dept: INTERNAL MEDICINE CLINIC | Age: 84
End: 2019-06-07

## 2019-06-07 DIAGNOSIS — N39.0 URINARY TRACT INFECTION WITHOUT HEMATURIA, SITE UNSPECIFIED: Primary | ICD-10-CM

## 2019-06-07 NOTE — TELEPHONE ENCOUNTER
Spoke with Junior Elio LPN charge at [facility]. Advised her MD wants to start nitrofurantoin 50 mg every day for uti suppression. She has requested to fax order to her attn [phone] - done. Confirmation received. Called pt's daughter Ramon Henson - left message MD has sent her My Chart message in regards to starting pt on nitrofurantoin 50 mg every day for uti suppression.

## 2019-06-07 NOTE — TELEPHONE ENCOUNTER
Regarding: FW: Non-Urgent Medical Question  Contact: [phone]  Call [facility] and daughter- refer her to my chart note. Start nitrofurantoin 50 mg every day for uti suppression  ----- Message -----  From: Maximino Hewitt LPN  Sent: 6/1/5397   9:41 AM  To: Brenda Boggs MD  Subject: FW: Non-Urgent Medical Question                  I called her and left message but have not had a chance to call her back yet. It is covered but she still has a co pay. It cost $182 for month supply - which comes out to $42.50 per week. I have not had a chance to talk to her about this. But now she has sent this message. ----- Message -----  From: Kingston Aldana  Sent: 6/5/2019   9:23 AM  To: Yair Arias [place]  Subject: Non-Urgent Medical Question                      ----- Message from [address] [place] [address] 951, [facility] sent at 6/5/2019  9:23 AM EDT -----    Good Morning Dr. Eden Linder,    I wanted to write you about Monorol for mom. You suggested it as a preventative antibiotic for her recurrent UTI's. Insurance has approved it but it will still cost $200 our of pocket each month which we think is too high. Is there an alternative that won't be harsh on her GI track? In the past she has struggled with Cipro but maybe the maintenance dose would be weaker and therefore not as harsh? Are there other options you could recommend?     Thanks for your help,  Skyla Zee

## 2019-06-10 ENCOUNTER — TELEPHONE (OUTPATIENT)
Dept: INTERNAL MEDICINE CLINIC | Age: 84
End: 2019-06-10

## 2019-06-11 DIAGNOSIS — N39.0 URINARY TRACT INFECTION WITHOUT HEMATURIA, SITE UNSPECIFIED: Primary | ICD-10-CM

## 2019-06-11 NOTE — TELEPHONE ENCOUNTER
Spoke with pt's daughter Dorita Chaudhry - asked if pt is having any symptoms? She states since pt dementia - she really can not tell her if she has symptoms but she has noticed odor,frequency and [facility] has told her pt has been wandering at night which is what happens when she has UTI. If MD is to start her on anything she knows she can not take Cipro - gives her GI issues. Will forward back to Dr Ana Aranda.

## 2019-06-11 NOTE — TELEPHONE ENCOUNTER
Called [facility] spoke with Janny [facility] taking care of pt. Requested she fax over results of urinalysis to office for MD to review. Received - was urine culture. On MD on call - Dr Trinity Lofton desk to review. Note: order to start Nitrofurantoin 50 daily for UTI suppression was held until urinalysis result final. Ok to call to advise to start as previously ordered? Will forward to MD on call - Dr Karely Flannery.

## 2019-06-11 NOTE — TELEPHONE ENCOUNTER
Spoke with Geetha at [facility] - advised her MD on call Dr Shahnaz Schmid has ordered Augmentin 875/125mg BID x 7 days. Repeat UA and Urine Culture when abx completed. She requested I fax order to [phone]. Confirmation received.

## 2019-06-19 ENCOUNTER — OFFICE VISIT (OUTPATIENT)
Dept: INTERNAL MEDICINE CLINIC | Age: 84
End: 2019-06-19

## 2019-06-19 VITALS
HEIGHT: 64 IN | WEIGHT: 127 LBS | BODY MASS INDEX: 21.68 KG/M2 | TEMPERATURE: 97.9 F | DIASTOLIC BLOOD PRESSURE: 64 MMHG | OXYGEN SATURATION: 97 % | SYSTOLIC BLOOD PRESSURE: 116 MMHG | RESPIRATION RATE: 16 BRPM | HEART RATE: 70 BPM

## 2019-06-19 DIAGNOSIS — M25.572 ACUTE LEFT ANKLE PAIN: Primary | ICD-10-CM

## 2019-06-19 DIAGNOSIS — S99.912A INJURY OF LEFT ANKLE, INITIAL ENCOUNTER: ICD-10-CM

## 2019-06-19 RX ORDER — DOCUSATE SODIUM 100 MG/1
100 CAPSULE, LIQUID FILLED ORAL 2 TIMES DAILY
COMMUNITY
End: 2019-07-02 | Stop reason: SDUPTHER

## 2019-06-19 NOTE — PROGRESS NOTES
Chief Complaint   Patient presents with    Ankle Pain     1. Have you been to the ER, urgent care clinic since your last visit? Hospitalized since your last visit? Yes Where: [facility]  Reason for visit: Back pain    2. Have you seen or consulted any other health care providers outside of the [address] [place] [address] since your last visit? Include any pap smears or colon screening.  Yes Where: Physical Therapy Reason for visit: Back pain

## 2019-06-19 NOTE — PROGRESS NOTES
HISTORY OF PRESENT ILLNESS  Lukasz Gonzalez is a 80 y.o. female. Ankle Pain    The history is provided by the relative and caregiver (in with daughter Amy Zhang who provides full hx). This is a new problem. The current episode started yesterday (not fully clear but she was notified of swelling and pain yesterday). The problem occurs constantly. The problem has not changed since onset. The pain is present in the left ankle and left foot. The quality of the pain is described as aching. The pain is severe (by observation). Associated symptoms include limited range of motion and stiffness. The symptoms are aggravated by standing and palpation. She has tried nothing for the symptoms. There has been a history of trauma (although not observed). Review of Systems   Unable to perform ROS: Dementia   Constitutional: Negative for chills and fever. Musculoskeletal: Positive for joint pain and stiffness. Negative for falls. Physical Exam   Musculoskeletal:        Left ankle: She exhibits decreased range of motion, swelling and ecchymosis. She exhibits no deformity. Tenderness. Lateral malleolus tenderness found. Feet:        ASSESSMENT and PLAN  Diagnoses and all orders for this visit:    1. Acute left ankle pain  -     REFERRAL TO ORTHOPEDIC SURGERY- Ortho on Call for ease and speed of evaluation. Concern for fracture    2.  Injury of left ankle, initial encounter  -     REFERRAL TO ORTHOPEDIC SURGERY based on xray eval      Plan was reviewed with patient and family, understanding expressed

## 2019-06-20 ENCOUNTER — TELEPHONE (OUTPATIENT)
Dept: INTERNAL MEDICINE CLINIC | Age: 84
End: 2019-06-20

## 2019-06-20 NOTE — TELEPHONE ENCOUNTER
----- Message from Arina Alvarez MD sent at 6/19/2019  6:10 PM EDT -----  Regarding: acs  Call [facility]- make sure they get urinalysis and culture before starting nitrofurantoin, which I sent in yesterday

## 2019-06-25 ENCOUNTER — TELEPHONE (OUTPATIENT)
Dept: INTERNAL MEDICINE CLINIC | Age: 84
End: 2019-06-25

## 2019-06-25 NOTE — TELEPHONE ENCOUNTER
Called [facility] - spoke with Lona Clement LPN charge nurse. MD wanted to make sure they got urinalysis and culture before starting nitrofurantoin? She states they sent out on Friday 6/21/19. She has not received results. Asked if they started med? No wanting on results. Asked where they sent it? [address] spoke with Monica. Advised her of above. She states they only received urinalysis. She will fax my results.  Will forward to MD.

## 2019-06-25 NOTE — TELEPHONE ENCOUNTER
Called [address] not available. Spoke with CHLOÉ Matthews. Advised her I called [facility] for pt's urinalysis result. MD has reviewed - said ok to start pt on nitrofurantoin. [facility] has order already.

## 2019-06-25 NOTE — TELEPHONE ENCOUNTER
----- Message from Virgilio Carrasco MD sent at 6/19/2019  6:10 PM EDT -----  Regarding: acs  Call [facility]- make sure they get urinalysis and culture before starting nitrofurantoin, which I sent in yesterday

## 2019-06-26 ENCOUNTER — TELEPHONE (OUTPATIENT)
Dept: INTERNAL MEDICINE CLINIC | Age: 84
End: 2019-06-26

## 2019-06-26 NOTE — TELEPHONE ENCOUNTER
Rhonda/[facility]  [phone]     Please call to clarify what dosage of trazadone Dr. Reymundo Lau is calling in.   OK to leave on VM

## 2019-06-26 NOTE — TELEPHONE ENCOUNTER
Bhavin Cheung () [phone] (H)     pt's daughter says that she sent dr Geo Mckenzie through my chart, but not sure you will get. Requesting a call back regarding this e mail.

## 2019-06-27 RX ORDER — TRAZODONE HYDROCHLORIDE 50 MG/1
50 TABLET ORAL
Qty: 30 TAB | Refills: 11 | Status: SHIPPED | OUTPATIENT
Start: 2019-06-27 | End: 2020-04-07

## 2019-06-27 NOTE — TELEPHONE ENCOUNTER
Spoke with CHLOÉ Ellis with [facility] - advised her MD has prescribed Trazodone 50 mg qhs. She has asked to send to [facility] - they will deliver to them. Will forward to MD for quantity and # of refills.

## 2019-06-27 NOTE — TELEPHONE ENCOUNTER
Left detailed message on pt's daughter's Irene personal vita - MD did receive her My Chart message. Rx will be sent to [address] [place], [place] [address]bartolo Moran If she needs to talk with me further to please call me back.

## 2019-07-02 ENCOUNTER — TELEPHONE (OUTPATIENT)
Dept: INTERNAL MEDICINE CLINIC | Age: 84
End: 2019-07-02

## 2019-07-02 RX ORDER — DOCUSATE SODIUM 100 MG/1
100 CAPSULE, LIQUID FILLED ORAL 2 TIMES DAILY
Qty: 60 CAP | Refills: 2 | Status: SHIPPED | OUTPATIENT
Start: 2019-07-02 | End: 2019-08-14 | Stop reason: SDUPTHER

## 2019-07-02 NOTE — TELEPHONE ENCOUNTER
Betty with [facility] [phone]     States they have sent a request for refill of docusate sodium (COLACE) 100 mg capsules and have not heard back. I do not see any requests -- also looks like another doctor prescribed it.

## 2019-07-02 NOTE — TELEPHONE ENCOUNTER
Called to speak with Rhonda. She was not available. No nurses available. Spoke with Winnie Keller Med Tech on pt's floor. Advised her I was calling Rhonda to let her know MD has sent in Colace 100 mg bid to pharmacy. She will give message to her.

## 2019-07-17 DIAGNOSIS — E06.3 AUTOIMMUNE HYPOTHYROIDISM: ICD-10-CM

## 2019-07-18 ENCOUNTER — TELEPHONE (OUTPATIENT)
Dept: INTERNAL MEDICINE CLINIC | Age: 84
End: 2019-07-18

## 2019-07-18 DIAGNOSIS — E06.3 AUTOIMMUNE HYPOTHYROIDISM: Primary | ICD-10-CM

## 2019-07-18 RX ORDER — LOSARTAN POTASSIUM 50 MG/1
TABLET ORAL
Qty: 30 TAB | Refills: 0 | Status: SHIPPED | OUTPATIENT
Start: 2019-07-18 | End: 2019-10-29 | Stop reason: SDUPTHER

## 2019-07-18 RX ORDER — LEVOTHYROXINE SODIUM 112 UG/1
TABLET ORAL
Qty: 30 TAB | Refills: 0 | Status: SHIPPED | OUTPATIENT
Start: 2019-07-18 | End: 2019-10-29 | Stop reason: SDUPTHER

## 2019-07-18 NOTE — TELEPHONE ENCOUNTER
----- Message from Jessica Medel MD sent at 7/18/2019  9:05 AM EDT -----  Regarding: acs  Call half-way- she is due for thyroid lab testing, can they draw ?   Yes- mail order for tsh  No - call daughter to bring here or any lab barry for the lab

## 2019-07-18 NOTE — TELEPHONE ENCOUNTER
Spoke with pt's daughter Sebastián Pradhan - advised her pt needs her thyroid lab done. Would she want half-way to draw or take pt to [facility] near her? She felt it was important to have done. She has TSH lab slip from 2/14/19 - will take pt out to have done.  Will forward to MD.

## 2019-08-02 ENCOUNTER — TELEPHONE (OUTPATIENT)
Dept: INTERNAL MEDICINE CLINIC | Age: 84
End: 2019-08-02

## 2019-08-02 NOTE — TELEPHONE ENCOUNTER
[phone]  Rhonda from [facility]  Living       Can she take stool softer 1 time a day instead of 2?

## 2019-08-06 NOTE — TELEPHONE ENCOUNTER
Spoke with CHLOÉ Ellis with [facility] - advised her MD has approved request to change pt's stool softener to once daily. Verbal order given.

## 2019-08-15 RX ORDER — DOCUSATE SODIUM 100 MG/1
CAPSULE, LIQUID FILLED ORAL
Qty: 30 CAP | Refills: 11 | Status: SHIPPED | OUTPATIENT
Start: 2019-08-15 | End: 2020-10-08

## 2019-08-20 ENCOUNTER — TELEPHONE (OUTPATIENT)
Dept: INTERNAL MEDICINE CLINIC | Age: 84
End: 2019-08-20

## 2019-08-20 DIAGNOSIS — N39.0 URINARY TRACT INFECTION WITHOUT HEMATURIA, SITE UNSPECIFIED: Primary | ICD-10-CM

## 2019-08-20 RX ORDER — CHOLECALCIFEROL (VITAMIN D3) 125 MCG
CAPSULE ORAL
Qty: 30 TAB | Refills: 11 | Status: SHIPPED | OUTPATIENT
Start: 2019-08-20 | End: 2020-10-08

## 2019-08-20 RX ORDER — CYANOCOBALAMIN (VITAMIN B-12) 500 MCG
TABLET ORAL
Qty: 30 TAB | Refills: 11 | Status: SHIPPED | OUTPATIENT
Start: 2019-08-20 | End: 2020-10-08

## 2019-08-20 RX ORDER — CEFUROXIME AXETIL 250 MG/1
500 TABLET ORAL 2 TIMES DAILY
Qty: 20 TAB | Refills: 0 | Status: SHIPPED | OUTPATIENT
Start: 2019-08-20 | End: 2019-08-28 | Stop reason: SDUPTHER

## 2019-08-20 NOTE — TELEPHONE ENCOUNTER
Called [facility] to speak with charge nurse in regards to pt's positive urine culture. Spoke with Canelo Rutledge, [address] [place] [address]. She  advised me Katina Black was in charge but unavailable. Advised her I had new orders for pt. She asked we fax order to ATTN: Katina Black [phone]. Sent with confirmation received. Rx sent to [address] [place] [address]. Also called pt's daughter Amira Munoz and advised pt had uti. MD was stop Macrodantin - resistent to organism growing. Starting Ceftin 250 mg po bid x5 days. Then continue Ceftin 250 mg tab daily maintenance.

## 2019-08-30 ENCOUNTER — TELEPHONE (OUTPATIENT)
Dept: INTERNAL MEDICINE CLINIC | Age: 84
End: 2019-08-30

## 2019-10-16 ENCOUNTER — TELEPHONE (OUTPATIENT)
Dept: INTERNAL MEDICINE CLINIC | Age: 84
End: 2019-10-16

## 2019-10-16 DIAGNOSIS — N39.0 URINARY TRACT INFECTION WITHOUT HEMATURIA, SITE UNSPECIFIED: Primary | ICD-10-CM

## 2019-10-16 NOTE — TELEPHONE ENCOUNTER
Rhonda [phone]  [facility] living       The patient is confused  and a sample of urine was taken and she will call back with the results tomorrow

## 2019-10-18 NOTE — TELEPHONE ENCOUNTER
Left detailed message for Rhonda to return the call with an update on Pt's confusion and need to fax urine results.

## 2019-10-21 ENCOUNTER — TELEPHONE (OUTPATIENT)
Dept: INTERNAL MEDICINE CLINIC | Age: 84
End: 2019-10-21

## 2019-10-21 NOTE — TELEPHONE ENCOUNTER
[facility] assisted living [phone]     rosa isela wants to know if a fax was received?

## 2019-10-22 ENCOUNTER — TELEPHONE (OUTPATIENT)
Dept: INTERNAL MEDICINE CLINIC | Age: 84
End: 2019-10-22

## 2019-10-22 RX ORDER — CIPROFLOXACIN 250 MG/1
250 TABLET, FILM COATED ORAL DAILY
Qty: 30 TAB | Refills: 11 | OUTPATIENT
Start: 2019-10-22

## 2019-10-22 RX ORDER — CIPROFLOXACIN 250 MG/1
250 TABLET, FILM COATED ORAL DAILY
COMMUNITY
End: 2019-11-29 | Stop reason: SINTOL

## 2019-10-22 RX ORDER — CIPROFLOXACIN 250 MG/1
250 TABLET, FILM COATED ORAL 2 TIMES DAILY
Qty: 10 TAB | Refills: 0 | OUTPATIENT
Start: 2019-10-22 | End: 2019-10-27

## 2019-10-22 NOTE — TELEPHONE ENCOUNTER
Informed Betty with [facility], per Dr Porfirio Hernandez to discontinue Ceftin. Start Cipro 250 mg BID x 5 days, then Cipro 250 mg daily for maintenance suppression. Betty request script to be faxed to [phone].

## 2019-10-22 NOTE — TELEPHONE ENCOUNTER
Spoke with Betty with [facility]. Informed per Dr Beto Chan to discontinue VO for Cipro 250 mg. Will notify with new instructions.

## 2019-10-23 ENCOUNTER — TELEPHONE (OUTPATIENT)
Dept: INTERNAL MEDICINE CLINIC | Age: 84
End: 2019-10-23

## 2019-10-23 NOTE — TELEPHONE ENCOUNTER
Spoke with Betty at [facility]. Gave order per Dr Karen Elizalde   Reviewed case, now with a resistant pseudomonas uti. Reviewed options of tapering off celexa and going with PO levaquin vs OPIC for IM gent vs See urologist as directed . She opts for PO therapy and understands there will be about a 5 d delay in uti rx.   - agrees, will monitor for signs of systemic illness for which there is none now. - plan for 10 celexa x 3 d, stop  - start levaquin 250 mg every day x 5 d on 10/28,  then 125 mg every day thereafter for suppression starting 11/2    Betty will fax telephone order to be signed and faxed back.

## 2019-10-29 ENCOUNTER — PATIENT MESSAGE (OUTPATIENT)
Dept: INTERNAL MEDICINE CLINIC | Age: 84
End: 2019-10-29

## 2019-10-29 ENCOUNTER — TELEPHONE (OUTPATIENT)
Dept: INTERNAL MEDICINE CLINIC | Age: 84
End: 2019-10-29

## 2019-10-29 DIAGNOSIS — E06.3 AUTOIMMUNE HYPOTHYROIDISM: ICD-10-CM

## 2019-10-29 RX ORDER — ACETAMINOPHEN 325 MG/1
650 TABLET ORAL
Qty: 50 TAB | Refills: 0 | Status: SHIPPED | OUTPATIENT
Start: 2019-10-29 | End: 2020-05-27

## 2019-10-29 RX ORDER — LOSARTAN POTASSIUM 50 MG/1
TABLET ORAL
Qty: 90 TAB | Refills: 0 | Status: SHIPPED | OUTPATIENT
Start: 2019-10-29 | End: 2019-12-13 | Stop reason: SDUPTHER

## 2019-10-29 RX ORDER — LEVOTHYROXINE SODIUM 112 UG/1
TABLET ORAL
Qty: 90 TAB | Refills: 0 | Status: SHIPPED | OUTPATIENT
Start: 2019-10-29 | End: 2019-12-13 | Stop reason: SDUPTHER

## 2019-11-05 ENCOUNTER — TELEPHONE (OUTPATIENT)
Dept: INTERNAL MEDICINE CLINIC | Age: 84
End: 2019-11-05

## 2019-11-05 NOTE — TELEPHONE ENCOUNTER
Spoke with Betty at [address] from Dr Beckie Lim, restart celexa at previous dosage    She will fax order for provider signature.

## 2019-11-13 ENCOUNTER — TELEPHONE (OUTPATIENT)
Dept: INTERNAL MEDICINE CLINIC | Age: 84
End: 2019-11-13

## 2019-11-13 NOTE — TELEPHONE ENCOUNTER
----- Message from Yayo Johnson MD sent at 11/13/2019 10:56 AM EST -----  Regarding: FW:PT, OT  Contact: [phone]  Call [facility] check urinalysis and culture.   ----- Message -----  From: Edervaishnavi Hopper  Sent: 11/12/2019   1:09 PM EST  To: Yayo Johnson MD  Subject: FW:PT, OT                                            ----- Message -----  From: John Cornea  Sent: 11/12/2019  12:10 PM EST  To: [facility]  Subject: RE:PT, OT                                        Yes, I think it would be good to begin PT/OT. It seems that stopping the Celexa to begin the Levaquin caused some cognitive problems. I know that you stopped the maintenance dose of Levaquin & she is back on Celexa. That is great and just yesterday she began walking again after 8 days of forgetting how. Any thoughts on how we should manage her constant UTI's? Thanks for your help,  Tommy Ordonez  ----- Message -----  From: Yayo Johnson MD  Sent: 11/12/2019  5:47 AM EST  To: John Cornea  Subject: PT, OT  Heriberto Bonilla- I received a request for the above services for your Mom. Did you want to proceed with this ? I like to check prior to signing orders like this.    Xiomara Nicholas

## 2019-11-25 ENCOUNTER — TELEPHONE (OUTPATIENT)
Dept: INTERNAL MEDICINE CLINIC | Age: 84
End: 2019-11-25

## 2019-11-29 DIAGNOSIS — F32.9 REACTIVE DEPRESSION: Primary | ICD-10-CM

## 2019-11-29 RX ORDER — CITALOPRAM 20 MG/1
TABLET, FILM COATED ORAL
Qty: 90 TAB | Refills: 0 | Status: SHIPPED | OUTPATIENT
Start: 2019-11-29 | End: 2020-02-14

## 2019-12-09 ENCOUNTER — TELEPHONE (OUTPATIENT)
Dept: INTERNAL MEDICINE CLINIC | Age: 84
End: 2019-12-09

## 2019-12-12 ENCOUNTER — TELEPHONE (OUTPATIENT)
Dept: INTERNAL MEDICINE CLINIC | Age: 84
End: 2019-12-12

## 2019-12-12 DIAGNOSIS — E06.3 AUTOIMMUNE HYPOTHYROIDISM: ICD-10-CM

## 2019-12-12 NOTE — TELEPHONE ENCOUNTER
----- Message from Rancho Jaramillo MD sent at 12/12/2019  1:10 PM EST -----  Regarding: acs  Call  1- [facility]- start amoxicillin 875 mg bid x 5 d, when completed start amoxicillin 500 mg every day   2- call daughter, starting another antibiotic for uti , followed by suppressive dose.

## 2019-12-12 NOTE — TELEPHONE ENCOUNTER
Spoke with Cele Garcia, Med Tech, advised need to speak with nurse for new medication order. Tech advised no nurses available. Tech advised that we fax order to be sent to pharmacy. Asked that nurse return call to discuss medication treatment. Understanding verbalized.

## 2019-12-12 NOTE — TELEPHONE ENCOUNTER
Spoke with Sekou Dejesus LPN with [facility], advised per provider to start amoxicillin 875 mg BID x 5 days, after completing start amoxicillin 500 mg every day. Order will be faxed for provider sig.  Understanding verbalized

## 2019-12-13 RX ORDER — LEVOTHYROXINE SODIUM 112 UG/1
TABLET ORAL
Qty: 30 TAB | Refills: 11 | Status: SHIPPED | OUTPATIENT
Start: 2019-12-13 | End: 2020-12-10

## 2019-12-13 RX ORDER — LOSARTAN POTASSIUM 50 MG/1
TABLET ORAL
Qty: 30 TAB | Refills: 11 | Status: SHIPPED | OUTPATIENT
Start: 2019-12-13 | End: 2021-02-11 | Stop reason: SDUPTHER

## 2019-12-23 DIAGNOSIS — N39.0 URINARY TRACT INFECTION WITHOUT HEMATURIA, SITE UNSPECIFIED: Primary | ICD-10-CM

## 2019-12-23 RX ORDER — AMOXICILLIN 500 MG/1
CAPSULE ORAL
Qty: 90 CAP | Refills: 3 | Status: SHIPPED | OUTPATIENT
Start: 2019-12-23 | End: 2020-12-28

## 2019-12-24 DIAGNOSIS — N39.0 URINARY TRACT INFECTION WITHOUT HEMATURIA, SITE UNSPECIFIED: ICD-10-CM

## 2019-12-24 RX ORDER — AMOXICILLIN 500 MG/1
CAPSULE ORAL
Qty: 90 CAP | Refills: 3 | OUTPATIENT
Start: 2019-12-24

## 2020-02-14 DIAGNOSIS — F32.9 REACTIVE DEPRESSION: ICD-10-CM

## 2020-02-14 RX ORDER — CITALOPRAM 20 MG/1
TABLET, FILM COATED ORAL
Qty: 30 TAB | Refills: 11 | Status: SHIPPED | OUTPATIENT
Start: 2020-02-14 | End: 2021-02-11 | Stop reason: SDUPTHER

## 2020-04-07 RX ORDER — TRAZODONE HYDROCHLORIDE 50 MG/1
TABLET ORAL
Qty: 30 TAB | Refills: 11 | Status: SHIPPED | OUTPATIENT
Start: 2020-04-07 | End: 2021-02-11 | Stop reason: SDUPTHER

## 2020-05-27 RX ORDER — ACETAMINOPHEN 325 MG/1
TABLET ORAL
Qty: 50 TAB | Refills: 0 | Status: SHIPPED | OUTPATIENT
Start: 2020-05-27 | End: 2020-10-02

## 2020-05-27 NOTE — TELEPHONE ENCOUNTER
Spoke with pt's daughter [facility]. Advised her MD received refill on her Tylenol and he wanted to know if pt had switched to the [facility] doctor? She stated she has not. Dr Meenu Hugo is still pt's pcp. Pt has been complaining of knee pain so they have been giving her Tylenol.  Will forward to MD.

## 2020-06-01 ENCOUNTER — TELEPHONE (OUTPATIENT)
Dept: INTERNAL MEDICINE CLINIC | Age: 85
End: 2020-06-01

## 2020-06-01 DIAGNOSIS — M79.605 PAIN IN LEFT LEG: ICD-10-CM

## 2020-06-01 DIAGNOSIS — M25.562 ACUTE PAIN OF LEFT KNEE: Primary | ICD-10-CM

## 2020-06-01 NOTE — TELEPHONE ENCOUNTER
Called [facility] - spoke with Ria Hsieh caring for pt. Advised her needed to speak with supervisor in regards to getting pt office visit for evaluation for her knee pain. Susan states she feels it is her whole left leg. She is giving pt Tylenol q4 hrs for pain without relief. MD wants to see if they can defer pt quarantine for 3 days if she comes in for office visit as our office taking full precautions during pandemic. Pt has dementia and pt would not handle well. She transferred me to [facility] for facility. Had to leave message to call me back. Called pt's daughter Jaleesa Dillard advised of above and MD wanting pt to come in to office and possible x-ray. She states it would be difficult but if MD wants her there and [facility] agrees she will bring her. Waiting on [facility] to call.

## 2020-06-01 NOTE — TELEPHONE ENCOUNTER
----- Message from Jenna Priest MD sent at 5/30/2020  3:09 PM EDT -----  [facility]  Call daughter - can they get her in for an appt ?  ----- Message -----  From: Elizabeth Siddiqui LPN  Sent: 6/01/4109   3:38 PM EDT  To: Jenna Priest MD  Subject: Leelee Barahona Fax Received                            Dr. Luis A Zamudio,    Received a fax today from [address] [place] [address] regarding the above patient. Tylenol is not effective and requesting stronger medication. Note indicates patient showing sign of pain, facial grimacing and crying. Last seen in 06/2019.

## 2020-06-01 NOTE — TELEPHONE ENCOUNTER
Spoke with pt's daughter Kay Lawrence advised her MD received fax that pt was having issues with pain. He wanted to see if she could bring her in for an office visit? She states if that is what MD wants she could try but pt not walking/moving well. Jose Cruz Moran will place in in isolation for 3 days and does not think pt will handle that very well. She felt it may be her toe nails but nurse told her they have trimmed them. Pt holds left knee when complining and crying in pain. Denies any injuries or falls. She states pt's worst time is in the morning when getting up. As day progresses she seems to get better. She is in wheelchair a lot.  Will forward to MD.

## 2020-06-01 NOTE — TELEPHONE ENCOUNTER
Spoke with Rocío Kirby - advised her of what MD is requesting. She states that is their protocol and can not change that. She did offer virtual visit using her e-mail. MD declined. She offered to have portable x-ray to help with diagnosis. MD agreed. Will fax order to ATTN: Maleo Sam [phone]. Advised pt's daughter of above.

## 2020-06-05 ENCOUNTER — TELEPHONE (OUTPATIENT)
Dept: INTERNAL MEDICINE CLINIC | Age: 85
End: 2020-06-05

## 2020-06-05 NOTE — TELEPHONE ENCOUNTER
sandie gregorio at [place] [address]     Says that pt is in a lot of pain with her left leg and only takine tylenol prn. She wonders if there is something stronger pt can take for this pain.   She is crying with pain

## 2020-06-05 NOTE — TELEPHONE ENCOUNTER
Returned call regarding Ms Raisa Joseph. I spoke to Natalia she stated paid had an xray and it did not show any abnormalities. She stated the patient is still in a lot of pain, and tylenol is not helping. patient was last seen in office 06-  Per Dr Steve Gabriel it was advised patient go to ER. Bobby Rosales

## 2020-06-16 RX ORDER — CHOLECALCIFEROL (VITAMIN D3) 125 MCG
220 CAPSULE ORAL DAILY
Qty: 14 CAP | Refills: 0 | Status: SHIPPED | OUTPATIENT
Start: 2020-06-16 | End: 2020-06-30

## 2020-08-13 ENCOUNTER — TELEPHONE (OUTPATIENT)
Dept: INTERNAL MEDICINE CLINIC | Age: 85
End: 2020-08-13

## 2020-08-25 NOTE — TELEPHONE ENCOUNTER
Spoke with Anita Lemus, Memory Care manager at pt's [facility]. She had called in regards to 3 refills sent in back on 8/11/20. Was declined due to pt needing appt. Pt not able to come for appts.  Will forward to MD.

## 2020-08-25 NOTE — TELEPHONE ENCOUNTER
Gasper Cihrinos, Memory Care manager at 's [facility] again. Asked if they had a physician there at the facility? They do. Pt can schedule appt here to see pcp or see their MD there at facility. It will be up to pt's daughter and what is best for pt. She will call pt's daughter to discuss this with her.  Will forward to MD.

## 2020-10-01 DIAGNOSIS — E06.3 AUTOIMMUNE HYPOTHYROIDISM: Primary | ICD-10-CM

## 2020-10-02 RX ORDER — ACETAMINOPHEN 325 MG/1
TABLET ORAL
Qty: 50 TAB | Refills: 0 | Status: SHIPPED | OUTPATIENT
Start: 2020-10-02 | End: 2020-10-07 | Stop reason: SDUPTHER

## 2020-10-07 RX ORDER — ACETAMINOPHEN 325 MG/1
650 TABLET ORAL
Qty: 50 TAB | Refills: 0 | Status: SHIPPED | OUTPATIENT
Start: 2020-10-07

## 2020-11-25 DIAGNOSIS — F02.818 LATE ONSET ALZHEIMER'S DISEASE WITH BEHAVIORAL DISTURBANCE (HCC): Primary | ICD-10-CM

## 2020-11-25 DIAGNOSIS — G30.1 LATE ONSET ALZHEIMER'S DISEASE WITH BEHAVIORAL DISTURBANCE (HCC): Primary | ICD-10-CM

## 2020-11-25 RX ORDER — LORAZEPAM 0.5 MG/1
0.5 TABLET ORAL
Qty: 30 TAB | Refills: 1 | Status: SHIPPED | OUTPATIENT
Start: 2020-11-25

## 2020-12-08 RX ORDER — CEFUROXIME AXETIL 250 MG/1
500 TABLET ORAL 2 TIMES DAILY
Qty: 14 TAB | Refills: 0 | Status: SHIPPED | OUTPATIENT
Start: 2020-12-08 | End: 2020-12-15

## 2020-12-09 ENCOUNTER — TELEPHONE (OUTPATIENT)
Dept: INTERNAL MEDICINE CLINIC | Age: 85
End: 2020-12-09

## 2020-12-09 DIAGNOSIS — E06.3 AUTOIMMUNE HYPOTHYROIDISM: ICD-10-CM

## 2020-12-09 NOTE — TELEPHONE ENCOUNTER
Dr Schneider/Telephone   Received: Yesterday   Message Contents   Argie Lennox [facility] Office Pool               General Message/Vendor Calls     Caller's first and last name: Margot, Jose N Haleigh Soto       Reason for call: Khanh Junior is requesting clarification on Rx for Ceftin 250 mg.  Per directions, it is to take 2 tablets PO two time per day for 7 days,with the qty of 14 tablets       Callback required yes/no and why: yes       Best contact number(s):[phone]       Details to clarify the request:      From envira

## 2020-12-09 NOTE — TELEPHONE ENCOUNTER
Chris Coelho with [address] called to make sure we had heard from the pharmacy about the discrepancy in quantity of Ceftin tabs. Only 14 were ordered, but should have been 28. Please let them know about this.         [phone] ext [phone]

## 2020-12-09 NOTE — TELEPHONE ENCOUNTER
Left detailed message for [facility] with [address] that order should be Ceftin 250 mg one tab bid x7 days. They should have enough. Also faxed written order to [address] [phone]. Confirmation received.

## 2020-12-10 RX ORDER — DOCUSATE SODIUM 100 MG/1
CAPSULE, LIQUID FILLED ORAL
Qty: 30 CAP | Refills: 11 | Status: SHIPPED | OUTPATIENT
Start: 2020-12-10

## 2020-12-10 RX ORDER — LEVOTHYROXINE SODIUM 112 UG/1
TABLET ORAL
Qty: 30 TAB | Refills: 11 | Status: SHIPPED | OUTPATIENT
Start: 2020-12-10 | End: 2021-02-11 | Stop reason: SDUPTHER

## 2020-12-10 RX ORDER — CYANOCOBALAMIN (VITAMIN B-12) 500 MCG
TABLET ORAL
Qty: 30 TAB | Refills: 11 | Status: SHIPPED | OUTPATIENT
Start: 2020-12-10

## 2020-12-10 RX ORDER — CHOLECALCIFEROL (VITAMIN D3) 125 MCG
CAPSULE ORAL
Qty: 30 TAB | Refills: 11 | Status: SHIPPED | OUTPATIENT
Start: 2020-12-10

## 2020-12-18 ENCOUNTER — TELEPHONE (OUTPATIENT)
Dept: INTERNAL MEDICINE CLINIC | Age: 85
End: 2020-12-18

## 2020-12-18 NOTE — TELEPHONE ENCOUNTER
Mohinder Liu MD  [facility] ORTHO Team Three Pool 3 days ago     Call assisted living- please send me the full culture report from latest uti      Maryam please try to call this today

## 2020-12-22 NOTE — TELEPHONE ENCOUNTER
Called [facility] - spoke with Ary Law LPN. Advised her MD requesting full urine culture report. She has faxed and received. In MD chart. Pt was treated with Ceftin 250 mg bid x7 days. Report in folder for MD to review.  Will forward to MD.

## 2020-12-28 RX ORDER — CEFUROXIME AXETIL 250 MG/1
250 TABLET ORAL DAILY
Qty: 30 TAB | Refills: 5 | Status: SHIPPED | OUTPATIENT
Start: 2020-12-28 | End: 2021-02-11 | Stop reason: SDUPTHER

## 2021-02-10 DIAGNOSIS — E06.3 AUTOIMMUNE HYPOTHYROIDISM: ICD-10-CM

## 2021-02-10 DIAGNOSIS — F32.9 REACTIVE DEPRESSION: ICD-10-CM

## 2021-02-11 RX ORDER — LEVOTHYROXINE SODIUM 112 UG/1
TABLET ORAL
Qty: 29 TAB | Refills: 10 | Status: SHIPPED | OUTPATIENT
Start: 2021-02-11

## 2021-02-11 RX ORDER — CITALOPRAM 20 MG/1
TABLET, FILM COATED ORAL
Qty: 29 TAB | Refills: 10 | Status: SHIPPED | OUTPATIENT
Start: 2021-02-11

## 2021-02-11 RX ORDER — LOSARTAN POTASSIUM 50 MG/1
TABLET ORAL
Qty: 29 TAB | Refills: 10 | Status: SHIPPED | OUTPATIENT
Start: 2021-02-11

## 2021-02-11 RX ORDER — TRAZODONE HYDROCHLORIDE 50 MG/1
TABLET ORAL
Qty: 29 TAB | Refills: 10 | Status: SHIPPED | OUTPATIENT
Start: 2021-02-11

## 2021-02-11 RX ORDER — CEFUROXIME AXETIL 250 MG/1
TABLET ORAL
Qty: 29 TAB | Refills: 10 | Status: SHIPPED | OUTPATIENT
Start: 2021-02-11

## 2022-03-18 PROBLEM — E06.3 AUTOIMMUNE HYPOTHYROIDISM: Status: ACTIVE | Noted: 2017-09-21

## 2022-03-19 PROBLEM — N39.0 UTI (URINARY TRACT INFECTION): Status: ACTIVE | Noted: 2019-04-10

## 2022-03-19 PROBLEM — Z66 DNR (DO NOT RESUSCITATE): Status: ACTIVE | Noted: 2018-05-22

## 2022-03-19 PROBLEM — E53.8 B12 DEFICIENCY: Status: ACTIVE | Noted: 2017-09-21

## 2022-03-20 PROBLEM — E55.9 VITAMIN D DEFICIENCY: Status: ACTIVE | Noted: 2017-09-21

## 2022-03-20 PROBLEM — G30.1 LATE ONSET ALZHEIMER'S DISEASE WITH BEHAVIORAL DISTURBANCE (HCC): Status: ACTIVE | Noted: 2018-05-01

## 2022-03-20 PROBLEM — F02.818 LATE ONSET ALZHEIMER'S DISEASE WITH BEHAVIORAL DISTURBANCE (HCC): Status: ACTIVE | Noted: 2018-05-01

## 2023-05-22 RX ORDER — LOSARTAN POTASSIUM 50 MG/1
TABLET ORAL
COMMUNITY
Start: 2021-02-11

## 2023-05-22 RX ORDER — DOCUSATE SODIUM 100 MG/1
1 CAPSULE, LIQUID FILLED ORAL DAILY
COMMUNITY
Start: 2020-12-10

## 2023-05-22 RX ORDER — ACETAMINOPHEN 325 MG/1
650 TABLET ORAL EVERY 6 HOURS PRN
COMMUNITY
Start: 2020-10-07

## 2023-05-22 RX ORDER — TRAZODONE HYDROCHLORIDE 50 MG/1
1 TABLET ORAL NIGHTLY
COMMUNITY
Start: 2021-02-11

## 2023-05-22 RX ORDER — LORAZEPAM 0.5 MG/1
0.5 TABLET ORAL
COMMUNITY
Start: 2020-11-25

## 2023-05-22 RX ORDER — LEVOTHYROXINE SODIUM 112 UG/1
TABLET ORAL
COMMUNITY
Start: 2021-02-11

## 2023-05-22 RX ORDER — CEFUROXIME AXETIL 250 MG/1
TABLET ORAL
COMMUNITY
Start: 2021-02-11

## 2023-05-22 RX ORDER — CITALOPRAM 20 MG/1
1 TABLET ORAL DAILY
COMMUNITY
Start: 2021-02-11